# Patient Record
Sex: FEMALE | Race: WHITE | Employment: OTHER | URBAN - METROPOLITAN AREA
[De-identification: names, ages, dates, MRNs, and addresses within clinical notes are randomized per-mention and may not be internally consistent; named-entity substitution may affect disease eponyms.]

---

## 2017-04-28 ENCOUNTER — OFFICE VISIT (OUTPATIENT)
Dept: OPHTHALMOLOGY | Facility: CLINIC | Age: 77
End: 2017-04-28

## 2017-04-28 DIAGNOSIS — H52.13 MYOPIA, BILATERAL: ICD-10-CM

## 2017-04-28 DIAGNOSIS — H25.10 SENILE NUCLEAR SCLEROSIS, UNSPECIFIED LATERALITY: ICD-10-CM

## 2017-04-28 DIAGNOSIS — H40.1131 PRIMARY OPEN ANGLE GLAUCOMA OF BOTH EYES, MILD STAGE: Primary | ICD-10-CM

## 2017-04-28 RX ORDER — TRAVOPROST OPHTHALMIC SOLUTION 0.04 MG/ML
1 SOLUTION OPHTHALMIC AT BEDTIME
Qty: 7.5 ML | Refills: 3 | Status: SHIPPED | OUTPATIENT
Start: 2017-04-28 | End: 2018-05-09

## 2017-04-28 ASSESSMENT — TONOMETRY
IOP_METHOD: APPLANATION
OS_IOP_MMHG: 17
OD_IOP_MMHG: 16

## 2017-04-28 ASSESSMENT — REFRACTION_MANIFEST
OS_SPHERE: -1.75
OD_AXIS: 180
OS_CYLINDER: +1.25
OS_AXIS: 170
OD_CYLINDER: +1.50
OD_SPHERE: -2.75

## 2017-04-28 ASSESSMENT — VISUAL ACUITY
METHOD: SNELLEN - LINEAR
OD_CC: 20/25
OS_CC: 20/30
OS_CC+: +2
OD_CC+: -2

## 2017-04-28 ASSESSMENT — REFRACTION_WEARINGRX
OS_CYLINDER: +1.25
SPECS_TYPE: SVL
OS_AXIS: 170
OS_SPHERE: -1.50
OD_SPHERE: -2.50
OD_CYLINDER: +1.50
OD_AXIS: 173

## 2017-04-28 ASSESSMENT — CONF VISUAL FIELD
OS_NORMAL: 1
OD_NORMAL: 1

## 2017-04-28 ASSESSMENT — PACHYMETRY
EXAM_DATE: 2007
OD_CT(UM): 573
OS_CT(UM): 572

## 2017-04-28 ASSESSMENT — EXTERNAL EXAM - RIGHT EYE: OD_EXAM: NORMAL

## 2017-04-28 ASSESSMENT — CUP TO DISC RATIO
OD_RATIO: 0.3
OS_RATIO: 0.25

## 2017-04-28 ASSESSMENT — EXTERNAL EXAM - LEFT EYE: OS_EXAM: NORMAL

## 2017-04-28 ASSESSMENT — SLIT LAMP EXAM - LIDS
COMMENTS: NORMAL
COMMENTS: NORMAL

## 2017-04-28 NOTE — MR AVS SNAPSHOT
After Visit Summary   2017    Kasia Hernandez    MRN: [id number]           Patient Information     Date Of Birth          1940        Visit Information        Provider Department      2017 10:40 AM Toby Hancock MD [facility]        Today's Diagnoses     Primary open angle glaucoma of both eyes, mild stage    -  1    Senile nuclear sclerosis, unspecified laterality - Both Eyes        Myopia, bilateral           Follow-ups after your visit        Follow-up notes from your care team     Return in about 4 months (around 2017) for Follow Up, Cataract, Glaucoma, BAT.      Who to contact     Please call your clinic at [phone] to:    Ask questions about your health    Make or cancel appointments    Discuss your medicines    Learn about your test results    Speak to your doctor   If you have compliments or concerns about an experience at your clinic, or if you wish to file a complaint, please contact [facility] Patient Relations at [phone] or email us at [email or web address]         Additional Information About Your Visit        MyChart Information     InnovEco is an electronic gateway that provides easy, online access to your medical records. With InnovEco, you can request a clinic appointment, read your test results, renew a prescription or communicate with your care team.     To sign up for InnovEco visit the website at [email or web address]   You will be asked to enter the access code listed below, as well as some personal information. Please follow the directions to create your username and password.     Your access code is: [email or web address]  Expires: 2017 11:56 AM     Your access code will  in 90 days. If you need help or a new code, please contact your [facility] or call [phone] for assistance.        Care EveryWhere ID     This is your Care  EveryWhere ID. This could be used by other organizations to access your [place] medical records  [id number]         Blood Pressure from Last 3 Encounters:   No data found for BP    Weight from Last 3 Encounters:   No data found for Wt              Today, you had the following     No orders found for display         Where to get your medicines      These medications were sent to [facility] - [place], [place] - [address] [place] [address] AT [facility] OF  [address]  [address], [facility] [address]     Phone:  [phone]     travoprost (MIR Free) 0.004 % ophthalmic solution          Primary Care Provider Office Phone # Fax #    Ulysses Ellison [phone]       [facility] [address]  [facility] [address]        Thank you!     Thank you for [facility]  for your care. Our goal is always to provide you with excellent care. Hearing back from our patients is one way we can continue to improve our services. Please take a few minutes to complete the written survey that you may receive in the mail after your visit with us. Thank you!             Your Updated Medication List - Protect others around you: Learn how to safely use, store and throw away your medicines at www.disposemymeds.org.          This list is accurate as of: 4/28/17 12:01 PM.  Always use your most recent med list.                   Brand Name Dispense Instructions for use    aMILoride-hydrochlorothiazide 5-50 MG Tabs per tablet    MODURETIC     Take 1 tablet by mouth daily       aspirin 325 MG tablet      Take by mouth daily       DIOVAN PO          LIPITOR PO      Take 1 tablet by mouth daily       NORVASC PO          PROLIXIN PO          SYNTHROID PO          TOPROL XL PO          travoprost (MIR Free) 0.004 % ophthalmic solution    TRAVATAN Z    7.5 mL    Place 1 drop into both eyes At Bedtime

## 2017-04-28 NOTE — NURSING NOTE
Chief Complaints and History of Present Illnesses   Patient presents with     Cataract Follow Up     No VA changes per Pt     Glaucoma Follow Up     HPI    Symptoms:     No floaters   No flashes   No Dryness   No itching         Do you have eye pain now?:  No      Comments:  Travatan-Z OU (9:00p.m.)  No OTC eye drops  Tati Ibarra COT 11:07 AM April 28, 2017

## 2017-04-28 NOTE — PROGRESS NOTES
Assessment & Plan      Kasia Hernandez is a 76 year old female with the following diagnoses:   (H40.1703) Primary open angle glaucoma of both eyes, mild stage  (primary encounter diagnosis)  Comment: Good control  Plan: travoprost, BAK Free, (TRAVATAN Z) 0.004 %         ophthalmic solution            (H25.10) Senile nuclear sclerosis, unspecified laterality - Both Eyes  Comment: Moderate  Plan: Follow,  BAT next time    (H52.13) Myopia, bilateral  Comment: Small change  Plan: Copy Rx     -----------------------------------------------------------------------------------      Patient disposition:   Return in about 4 months (around 8/28/2017) for Follow Up, Cataract, Glaucoma, BAT. or sooner as needed.    Complete documentation of historical and exam elements from today's encounter can  be found in the full encounter summary report (not reduplicated in this progress  note). I personally obtained the chief complaint(s) and history of present illness. I  confirmed and edited as necessary the review of systems, past medical/surgical  history, family history, social history, and examination findings as documented by  others; and I examined the patient myself. I personally reviewed the relevant tests,  images, and reports as documented above. I formulated and edited as necessary the  assessment and plan and discussed the findings and management plan with the  patient and family.    MAU Hancock M.D

## 2017-08-23 ENCOUNTER — OFFICE VISIT (OUTPATIENT)
Dept: OPHTHALMOLOGY | Facility: CLINIC | Age: 77
End: 2017-08-23

## 2017-08-23 DIAGNOSIS — H25.10 SENILE NUCLEAR SCLEROSIS, UNSPECIFIED LATERALITY: ICD-10-CM

## 2017-08-23 DIAGNOSIS — H40.1131 PRIMARY OPEN ANGLE GLAUCOMA OF BOTH EYES, MILD STAGE: Primary | ICD-10-CM

## 2017-08-23 ASSESSMENT — CONF VISUAL FIELD
OD_NORMAL: 1
OS_NORMAL: 1

## 2017-08-23 ASSESSMENT — VISUAL ACUITY
OD_CC: 20/25
OS_CC: 20/25
METHOD: SNELLEN - LINEAR
OD_CC+: -2
CORRECTION_TYPE: GLASSES

## 2017-08-23 ASSESSMENT — EXTERNAL EXAM - LEFT EYE: OS_EXAM: NORMAL

## 2017-08-23 ASSESSMENT — TONOMETRY
OS_IOP_MMHG: 14
IOP_METHOD: APPLANATION
OD_IOP_MMHG: 14

## 2017-08-23 ASSESSMENT — PACHYMETRY
OS_CT(UM): 572
OD_CT(UM): 573
EXAM_DATE: 2007

## 2017-08-23 ASSESSMENT — SLIT LAMP EXAM - LIDS
COMMENTS: NORMAL
COMMENTS: NORMAL

## 2017-08-23 ASSESSMENT — EXTERNAL EXAM - RIGHT EYE: OD_EXAM: NORMAL

## 2017-08-23 ASSESSMENT — CUP TO DISC RATIO
OD_RATIO: 0.3
OS_RATIO: 0.25

## 2017-08-23 NOTE — MR AVS SNAPSHOT
After Visit Summary   2017    Kasia Hernandez    MRN: [id number]           Patient Information     Date Of Birth          1940        Visit Information        Provider Department      2017 1:40 PM Toby Hancock MD [facility]        Today's Diagnoses     Primary open angle glaucoma of both eyes, mild stage    -  1    Senile nuclear sclerosis, unspecified laterality - Both Eyes           Follow-ups after your visit        Follow-up notes from your care team     Return in about 4 months (around 2017) for Follow Up, Glaucoma, OCT, Cataract.      Who to contact     Please call your clinic at [phone] to:    Ask questions about your health    Make or cancel appointments    Discuss your medicines    Learn about your test results    Speak to your doctor   If you have compliments or concerns about an experience at your clinic, or if you wish to file a complaint, please contact [facility] Patient Relations at [phone] or email us at [email or web address]         Additional Information About Your Visit        MyChart Information     Sportpost.com is an electronic gateway that provides easy, online access to your medical records. With Sportpost.com, you can request a clinic appointment, read your test results, renew a prescription or communicate with your care team.     To sign up for Sportpost.com visit the website at [email or web address]   You will be asked to enter the access code listed below, as well as some personal information. Please follow the directions to create your username and password.     Your access code is: [email or web address]  Expires: 2017  2:13 PM     Your access code will  in 90 days. If you need help or a new code, please contact your [facility] or call [phone] for assistance.        Care EveryWhere ID     This is your Care EveryWhere ID. This could be  used by other organizations to access your [place] medical records  [id number]         Blood Pressure from Last 3 Encounters:   No data found for BP    Weight from Last 3 Encounters:   No data found for Wt              Today, you had the following     No orders found for display       Primary Care Provider Office Phone # Fax #    Ulysses Ellison [phone]       [facility] [address]  [place] [address]        Equal Access to Services     [facility]: Hadii aad ku hadasho Soomaali, waaxda luqadaha, qaybta kaalmada adeegyada, waxay idiin hayaan adeeg kharash la'aan . So [facility] [phone].    ATENCIÓN: Si habla español, tiene a swift disposición servicios gratuitos de asistencia lingüística. [facility] [phone].    We comply with applicable federal civil rights laws and [place] laws. We do not discriminate on the basis of race, color, national origin, age, disability sex, sexual orientation or gender identity.            Thank you!     Thank you for choosing [facility]  for your care. Our goal is always to provide you with excellent care. Hearing back from our patients is one way we can continue to improve our services. Please take a few minutes to complete the written survey that you may receive in the mail after your visit with us. Thank you!             Your Updated Medication List - Protect others around you: Learn how to safely use, store and throw away your medicines at www.disposemymeds.org.          This list is accurate as of: 8/23/17  2:13 PM.  Always use your most recent med list.                   Brand Name Dispense Instructions for use Diagnosis    aMILoride-hydrochlorothiazide 5-50 MG Tabs per tablet    MODURETIC     Take 1 tablet by mouth daily        aspirin 325 MG tablet      Take by mouth daily        DIOVAN PO           LIPITOR PO      Take 1 tablet by mouth daily        NORVASC PO           PROLIXIN PO           SYNTHROID  PO           TOPROL XL PO           travoprost (MIR Free) 0.004 % ophthalmic solution    TRAVATAN Z    7.5 mL    Place 1 drop into both eyes At Bedtime    Primary open angle glaucoma of both eyes, mild stage

## 2017-08-23 NOTE — NURSING NOTE
Chief Complaints and History of Present Illnesses   Patient presents with     Glaucoma Follow Up     No VA changes     HPI    Affected eye(s):  Both   Symptoms:              Comments:  Colleen(10:30pm)TROY Ibarra COT 1:58 PM August 23, 2017

## 2017-08-23 NOTE — PROGRESS NOTES
Assessment & Plan      Kasia Hernandez is a 76 year old female with the following diagnoses:   (H40.6840) Primary open angle glaucoma of both eyes, mild stage  (primary encounter diagnosis)  Comment: Good control  Plan: Cont Travatan-INDY OU    (H25.10) Senile nuclear sclerosis, unspecified laterality - Both Eyes  Comment: Moderate  Plan: Follow     -----------------------------------------------------------------------------------      Patient disposition:   Return in about 4 months (around 12/23/2017) for Follow Up, Glaucoma, OCT, Cataract. or sooner as needed.    Complete documentation of historical and exam elements from today's encounter can  be found in the full encounter summary report (not reduplicated in this progress  note). I personally obtained the chief complaint(s) and history of present illness. I  confirmed and edited as necessary the review of systems, past medical/surgical  history, family history, social history, and examination findings as documented by  others; and I examined the patient myself. I personally reviewed the relevant tests,  images, and reports as documented above. I formulated and edited as necessary the  assessment and plan and discussed the findings and management plan with the  patient and family.    MAU Hancock M.D

## 2018-01-05 ENCOUNTER — OFFICE VISIT (OUTPATIENT)
Dept: OPHTHALMOLOGY | Facility: CLINIC | Age: 78
End: 2018-01-05
Payer: MEDICARE

## 2018-01-05 DIAGNOSIS — H40.1131 PRIMARY OPEN ANGLE GLAUCOMA OF BOTH EYES, MILD STAGE: Primary | ICD-10-CM

## 2018-01-05 DIAGNOSIS — H25.10 SENILE NUCLEAR SCLEROSIS, UNSPECIFIED LATERALITY: ICD-10-CM

## 2018-01-05 ASSESSMENT — CONF VISUAL FIELD
METHOD: COUNTING FINGERS
OS_NORMAL: 1
OD_NORMAL: 1

## 2018-01-05 ASSESSMENT — TONOMETRY
IOP_METHOD: APPLANATION
OD_IOP_MMHG: 15
OS_IOP_MMHG: 16

## 2018-01-05 ASSESSMENT — VISUAL ACUITY
OD_CC: 20/25
OS_CC: 20/30
CORRECTION_TYPE: GLASSES
OS_CC+: -2
METHOD: SNELLEN - LINEAR

## 2018-01-05 ASSESSMENT — PACHYMETRY
EXAM_DATE: 2007
OS_CT(UM): 572
OD_CT(UM): 573

## 2018-01-05 ASSESSMENT — CUP TO DISC RATIO
OS_RATIO: 0.25
OD_RATIO: 0.3

## 2018-01-05 ASSESSMENT — EXTERNAL EXAM - LEFT EYE: OS_EXAM: NORMAL

## 2018-01-05 ASSESSMENT — SLIT LAMP EXAM - LIDS
COMMENTS: NORMAL
COMMENTS: NORMAL

## 2018-01-05 ASSESSMENT — EXTERNAL EXAM - RIGHT EYE: OD_EXAM: NORMAL

## 2018-01-05 NOTE — PROGRESS NOTES
Assessment & Plan      Kasia Hernandez is a 77 year old female with the following diagnoses:   (H40.8297) Primary open angle glaucoma of both eyes, mild stage  (primary encounter diagnosis)  Comment: Good control  Plan: OCT Optic Nerve RNFL Spectralis OU (both eyes)        Cont Travatan-Z OU    (H25.10) Senile nuclear sclerosis, unspecified laterality - Both Eyes  Comment: Moderate  Plan: Follow     -----------------------------------------------------------------------------------      Patient disposition:   Return in about 4 months (around 5/5/2018) for Follow Up, Glaucoma, VF's. or sooner as needed.    Complete documentation of historical and exam elements from today's encounter can  be found in the full encounter summary report (not reduplicated in this progress  note). I personally obtained the chief complaint(s) and history of present illness. I  confirmed and edited as necessary the review of systems, past medical/surgical  history, family history, social history, and examination findings as documented by  others; and I examined the patient myself. I personally reviewed the relevant tests,  images, and reports as documented above. I formulated and edited as necessary the  assessment and plan and discussed the findings and management plan with the  patient and family.    MAU Hancock M.D

## 2018-01-05 NOTE — MR AVS SNAPSHOT
After Visit Summary   2018    Kasia Hernandez    MRN: [id number]           Patient Information     Date Of Birth          1940        Visit Information        Provider Department      2018 8:40 AM Toby Hancock MD [facility]        Today's Diagnoses     Primary open angle glaucoma of both eyes, mild stage    -  1    Senile nuclear sclerosis, unspecified laterality - Both Eyes           Follow-ups after your visit        Follow-up notes from your care team     Return in about 4 months (around 2018) for Follow Up, Glaucoma, VF's.      Who to contact     Please call your clinic at [phone] to:    Ask questions about your health    Make or cancel appointments    Discuss your medicines    Learn about your test results    Speak to your doctor   If you have compliments or concerns about an experience at your clinic, or if you wish to file a complaint, please contact [facility] Patient Relations at [phone] or email us at [email or web address]         Additional Information About Your Visit        MyChart Information     [employer] is an electronic gateway that provides easy, online access to your medical records. With [employer], you can request a clinic appointment, read your test results, renew a prescription or communicate with your care team.     To sign up for [employer] visit the website at [email or web address]   You will be asked to enter the access code listed below, as well as some personal information. Please follow the directions to create your username and password.     Your access code is: [email or web address]  Expires: 3/22/2018  6:30 AM     Your access code will  in 90 days. If you need help or a new code, please contact your [facility] or call [phone] for assistance.        Care EveryWhere ID     This is your Care EveryWhere ID. This could be used by other  organizations to access your [place] medical records  [id number]         Blood Pressure from Last 3 Encounters:   No data found for BP    Weight from Last 3 Encounters:   No data found for Wt              We Performed the Following     OCT Optic Nerve RNFL Spectralis OU (both eyes)        Primary Care Provider Office Phone # Fax #    Ulysses Ellison [phone]       [facility] [address]  [place] [address]        Equal Access to Services     [facility]: Hadii aad ku hadasho Soomaali, waaxda luqadaha, qaybta kaalmada adeegyada, waxay idiin hayaan adeeg kharash la'aan . So [facility] [place] [phone].    ATENCIÓN: Si habla español, tiene a swift disposición servicios gratuitos de asistencia lingüística. [facility] [phone].    We comply with applicable federal civil rights laws and [place] laws. We do not discriminate on the basis of race, color, national origin, age, disability, sex, sexual orientation, or gender identity.            Thank you!     Thank you for choosing [facility]  for your care. Our goal is always to provide you with excellent care. Hearing back from our patients is one way we can continue to improve our services. Please take a few minutes to complete the written survey that you may receive in the mail after your visit with us. Thank you!             Your Updated Medication List - Protect others around you: Learn how to safely use, store and throw away your medicines at www.disposemymeds.org.          This list is accurate as of: 1/5/18  9:11 AM.  Always use your most recent med list.                   Brand Name Dispense Instructions for use Diagnosis    aMILoride-hydrochlorothiazide 5-50 MG Tabs per tablet    MODURETIC     Take 1 tablet by mouth daily        aspirin 325 MG tablet      Take by mouth daily        DIOVAN PO           LIPITOR PO      Take 1 tablet by mouth daily        NORVASC PO           PROLIXIN PO            SYNTHROID PO           TOPROL XL PO           travoprost (MIR Free) 0.004 % ophthalmic solution    TRAVATAN Z    7.5 mL    Place 1 drop into both eyes At Bedtime    Primary open angle glaucoma of both eyes, mild stage

## 2018-05-09 ENCOUNTER — OFFICE VISIT (OUTPATIENT)
Dept: OPHTHALMOLOGY | Facility: CLINIC | Age: 78
End: 2018-05-09
Payer: MEDICARE

## 2018-05-09 DIAGNOSIS — H25.10 SENILE NUCLEAR SCLEROSIS, UNSPECIFIED LATERALITY: ICD-10-CM

## 2018-05-09 DIAGNOSIS — H40.1131 PRIMARY OPEN ANGLE GLAUCOMA OF BOTH EYES, MILD STAGE: Primary | ICD-10-CM

## 2018-05-09 RX ORDER — TRAVOPROST OPHTHALMIC SOLUTION 0.04 MG/ML
1 SOLUTION OPHTHALMIC AT BEDTIME
Qty: 7.5 ML | Refills: 3 | Status: SHIPPED | OUTPATIENT
Start: 2018-05-09 | End: 2019-04-30

## 2018-05-09 ASSESSMENT — PACHYMETRY
EXAM_DATE: 2007
OS_CT(UM): 572
OD_CT(UM): 573

## 2018-05-09 ASSESSMENT — CONF VISUAL FIELD
METHOD: COUNTING FINGERS
OD_NORMAL: 1
OS_NORMAL: 1

## 2018-05-09 ASSESSMENT — VISUAL ACUITY
OD_CC+: +1
OD_CC: 20/30
OS_CC: 20/25
OS_CC+: -2
CORRECTION_TYPE: GLASSES
METHOD: SNELLEN - LINEAR

## 2018-05-09 ASSESSMENT — CUP TO DISC RATIO
OS_RATIO: 0.25
OD_RATIO: 0.3

## 2018-05-09 ASSESSMENT — EXTERNAL EXAM - RIGHT EYE: OD_EXAM: NORMAL

## 2018-05-09 ASSESSMENT — TONOMETRY
OD_IOP_MMHG: 15
OS_IOP_MMHG: 15
IOP_METHOD: TONOPEN

## 2018-05-09 ASSESSMENT — SLIT LAMP EXAM - LIDS
COMMENTS: NORMAL
COMMENTS: NORMAL

## 2018-05-09 ASSESSMENT — EXTERNAL EXAM - LEFT EYE: OS_EXAM: NORMAL

## 2018-05-09 NOTE — PROGRESS NOTES
Assessment & Plan      Kasia Hernandez is a 77 year old female with the following diagnoses:   (H40.4709) Primary open angle glaucoma of both eyes, mild stage  (primary encounter diagnosis)  Comment: Good control  Plan: travoprost, BAK Free, (TRAVATAN Z) 0.004 %         ophthalmic solution, OVF 24-2 Dynamic OU            (H25.10) Senile nuclear sclerosis, unspecified laterality - Both Eyes  Comment: Moderate  Plan: Follow     -----------------------------------------------------------------------------------      Patient disposition:   Return in about 4 months (around 9/9/2018) for Complete Eye Exam, Cataract, Glaucoma, BAT. or sooner as needed.    Complete documentation of historical and exam elements from today's encounter can  be found in the full encounter summary report (not reduplicated in this progress  note). I personally obtained the chief complaint(s) and history of present illness. I  confirmed and edited as necessary the review of systems, past medical/surgical  history, family history, social history, and examination findings as documented by  others; and I examined the patient myself. I personally reviewed the relevant tests,  images, and reports as documented above. I formulated and edited as necessary the  assessment and plan and discussed the findings and management plan with the  patient and family.    MAU Hancock M.D

## 2018-05-09 NOTE — MR AVS SNAPSHOT
After Visit Summary   2018    Kasia Hernandez    MRN: [id number]           Patient Information     Date Of Birth          1940        Visit Information        Provider Department      2018 9:20 AM Toby Hancock MD [facility]        Today's Diagnoses     Primary open angle glaucoma of both eyes, mild stage    -  1    Senile nuclear sclerosis, unspecified laterality - Both Eyes           Follow-ups after your visit        Follow-up notes from your care team     Return in about 4 months (around 2018) for Complete Eye Exam, Cataract, Glaucoma, BAT.      Who to contact     Please call your clinic at [phone] to:    Ask questions about your health    Make or cancel appointments    Discuss your medicines    Learn about your test results    Speak to your doctor            Additional Information About Your Visit        MyChart Information     Straight Up English is an electronic gateway that provides easy, online access to your medical records. With Straight Up English, you can request a clinic appointment, read your test results, renew a prescription or communicate with your care team.     To sign up for AboutOnet visit the website at [email or web address]   You will be asked to enter the access code listed below, as well as some personal information. Please follow the directions to create your username and password.     Your access code is: [email or web address]  Expires: 2018  6:30 AM     Your access code will  in 90 days. If you need help or a new code, please contact your [facility] or call [phone] for assistance.        Care EveryWhere ID     This is your Care EveryWhere ID. This could be used by other organizations to access your [place] medical records  [id number]         Blood Pressure from Last 3 Encounters:   No data found for BP    Weight from Last 3 Encounters:   No data found for Wt              We Performed the  Following     OVF 24-2 Dynamic OU          Where to get your medicines      These medications were sent to [facility] [address] [place] [address] AT [facility] OF  [address]  [address] [place] [address], [facility] [address]     Phone:  [phone]     travoprost (MIR Free) 0.004 % ophthalmic solution          Primary Care Provider Office Phone # Fax #    Ulysses Ellison [phone]       [facility] [address]  [facility] [address]        Equal Access to Services     [facility]: Hadii aad ku hadasho Soomaali, waaxda luqadaha, qaybta kaalmada adeegyada, carmel lyles haylorenan yen smith . So [facility] [place] [phone].    ATENCIÓN: Si habla español, tiene a swift disposición servicios gratuitos de asistencia lingüística. [facility] [phone].    We comply with applicable federal civil rights laws and [place] laws. We do not discriminate on the basis of race, color, national origin, age, disability, sex, sexual orientation, or gender identity.            Thank you!     Thank you for choosing [place] [facility]  for your care. Our goal is always to provide you with excellent care. Hearing back from our patients is one way we can continue to improve our services. Please take a few minutes to complete the written survey that you may receive in the mail after your visit with us. Thank you!             Your Updated Medication List - Protect others around you: Learn how to safely use, store and throw away your medicines at www.disposemymeds.org.          This list is accurate as of 5/9/18  6:21 PM.  Always use your most recent med list.                   Brand Name Dispense Instructions for use Diagnosis    aMILoride-hydrochlorothiazide 5-50 MG Tabs per tablet    MODURETIC     Take 1 tablet by mouth daily        aspirin 325 MG tablet      Take by mouth daily        DIOVAN PO           LIPITOR PO      Take 1 tablet by mouth daily         NORVASC PO           PROLIXIN PO           SYNTHROID PO           TOPROL XL PO           travoprost (MIR Free) 0.004 % ophthalmic solution    TRAVATAN Z    7.5 mL    Place 1 drop into both eyes At Bedtime    Primary open angle glaucoma of both eyes, mild stage

## 2018-05-09 NOTE — NURSING NOTE
No chief complaint on file.    HPI    Affected eye(s):  Both   Symptoms:     No decreased vision   No floaters   No flashes   No tearing   No Dryness      Duration:  6 months   Frequency:  Constant       Do you have eye pain now?:  No      Comments:  Rina ARCHER to BE / CHARLI @ 8:30 pm last cameron Hernandez COT 10:00 AM 05/09/2018

## 2018-09-04 ENCOUNTER — OFFICE VISIT (OUTPATIENT)
Dept: OPHTHALMOLOGY | Facility: CLINIC | Age: 78
End: 2018-09-04
Payer: MEDICARE

## 2018-09-04 DIAGNOSIS — H40.1111 PRIMARY OPEN ANGLE GLAUCOMA OF RIGHT EYE, MILD STAGE: Primary | ICD-10-CM

## 2018-09-04 DIAGNOSIS — H25.13 NUCLEAR SCLEROTIC CATARACT OF BOTH EYES: ICD-10-CM

## 2018-09-04 DIAGNOSIS — H52.203 MYOPIC ASTIGMATISM OF BOTH EYES: ICD-10-CM

## 2018-09-04 DIAGNOSIS — H52.13 MYOPIC ASTIGMATISM OF BOTH EYES: ICD-10-CM

## 2018-09-04 RX ORDER — LOSARTAN POTASSIUM 100 MG/1
100 TABLET ORAL EVERY MORNING
COMMUNITY
Start: 2018-08-01

## 2018-09-04 ASSESSMENT — TONOMETRY
OS_IOP_MMHG: 14
IOP_METHOD: TONOPEN
OD_IOP_MMHG: 17

## 2018-09-04 ASSESSMENT — REFRACTION_WEARINGRX
OD_AXIS: 173
OS_AXIS: 170
OD_CYLINDER: +1.50
OS_SPHERE: -1.50
OS_CYLINDER: +1.25
SPECS_TYPE: SVL
OD_SPHERE: -2.50

## 2018-09-04 ASSESSMENT — VISUAL ACUITY
METHOD: SNELLEN - LINEAR
OD_CC: 20/30-
OS_CC: 20/25-
CORRECTION_TYPE: GLASSES

## 2018-09-04 ASSESSMENT — CONF VISUAL FIELD
OS_NORMAL: 1
METHOD: COUNTING FINGERS
OD_NORMAL: 1

## 2018-09-04 ASSESSMENT — SLIT LAMP EXAM - LIDS
COMMENTS: NORMAL
COMMENTS: NORMAL

## 2018-09-04 ASSESSMENT — REFRACTION_MANIFEST
OS_ADD: +2.75
OD_CYLINDER: +1.25
OS_SPHERE: -1.75
OD_AXIS: 005
OD_SPHERE: -3.00
OD_ADD: +2.75
OS_AXIS: 160
OS_CYLINDER: +1.25

## 2018-09-04 ASSESSMENT — CUP TO DISC RATIO
OS_RATIO: 0.25
OD_RATIO: 0.3

## 2018-09-04 ASSESSMENT — EXTERNAL EXAM - LEFT EYE: OS_EXAM: NORMAL

## 2018-09-04 ASSESSMENT — EXTERNAL EXAM - RIGHT EYE: OD_EXAM: NORMAL

## 2018-09-04 NOTE — MR AVS SNAPSHOT
After Visit Summary   2018    Kasia Hernandez    MRN: [id number]           Patient Information     Date Of Birth          1940        Visit Information        Provider Department      2018 10:40 AM Alivia Davalos MD [facility]        Today's Diagnoses     Primary open angle glaucoma of right eye, mild stage - Both Eyes    -  1    Nuclear sclerotic cataract of both eyes - Both Eyes        Myopic astigmatism of both eyes - Both Eyes           Follow-ups after your visit        Follow-up notes from your care team     Return in about 4 months (around 2019) for follow up- POAG, iop check, , OCT nerve.      Your next 10 appointments already scheduled     2019 10:40 AM CST   Return Visit with Alivia aDvalos MD   [facility])    [facility]  [address] [id number] [phone]              Who to contact     Please call your clinic at [phone] to:    Ask questions about your health    Make or cancel appointments    Discuss your medicines    Learn about your test results    Speak to your doctor            Additional Information About Your Visit        MyChart Information     Cybereason[employer] is an electronic gateway that provides easy, online access to your medical records. With COGEON, you can request a clinic appointment, read your test results, renew a prescription or communicate with your care team.     To sign up for Yu Rongt visit the website at [email or web address]   You will be asked to enter the access code listed below, as well as some personal information. Please follow the directions to create your username and password.     Your access code is: [email or web address]  Expires: 2018  6:30 AM     Your access code will  in 90 days. If you need help or a new code, please contact your [facility]  ----- Message from NBA Valentine sent at 5/18/2017  5:04 PM CDT -----  Please contact patient and inform Pap is normal. Repeat in 3 years. Thank you.   [facility] or call [phone] for assistance.        Care EveryWhere ID     This is your Care EveryWhere ID. This could be used by other organizations to access your [place] medical records  [id number]         Blood Pressure from Last 3 Encounters:   No data found for BP    Weight from Last 3 Encounters:   No data found for Wt              We Performed the Following     OVF 24-2 Dynamic OU     REFRACTION          Today's Medication Changes          These changes are accurate as of 9/4/18  3:21 PM.  If you have any questions, ask your nurse or doctor.               Stop taking these medicines if you haven't already. Please contact your care team if you have questions.     DIOVAN PO   Stopped by:  Alivia Davalos MD                    Primary Care Provider Office Phone # Fax #    Ulysses Ellison [phone]       [facility] [address]  [place] [address]        Equal Access to Services     [facility]: Hadii aad ku hadasho Soomaali, waaxda luqadaha, qaybta kaalmada adeegyada, waxay idiin hayaan adeeg kharash la'aan . So [facility] [phone].    ATENCIÓN: Si habla español, tiene a swift disposición servicios gratuitos de asistencia lingüística. Llame al [phone].    We comply with applicable federal civil rights laws and [place] laws. We do not discriminate on the basis of race, color, national origin, age, disability, sex, sexual orientation, or gender identity.            Thank you!     Thank you for choosing [facility]  for your care. Our goal is always to provide you with excellent care. Hearing back from our patients is one way we can continue to improve our services. Please take a few minutes to complete the written survey that you may receive in the mail after your visit with us. Thank you!             Your Updated Medication List - Protect others around you: Learn how to safely use, store and throw away your  medicines at www.disposemymeds.org.          This list is accurate as of 9/4/18  3:21 PM.  Always use your most recent med list.                   Brand Name Dispense Instructions for use Diagnosis    aMILoride-hydrochlorothiazide 5-50 MG Tabs per tablet    MODURETIC     Take 1 tablet by mouth daily        aspirin 325 MG tablet      Take by mouth daily        LIPITOR PO      Take 1 tablet by mouth daily        losartan 100 MG tablet    COZAAR     Take 100 mg by mouth        NORVASC PO           PROLIXIN PO           SYNTHROID PO           TOPROL XL PO           travoprost (MIR Free) 0.004 % ophthalmic solution    TRAVATAN Z    7.5 mL    Place 1 drop into both eyes At Bedtime    Primary open angle glaucoma of both eyes, mild stage

## 2019-01-11 ENCOUNTER — OFFICE VISIT (OUTPATIENT)
Dept: OPHTHALMOLOGY | Facility: CLINIC | Age: 79
End: 2019-01-11
Payer: MEDICARE

## 2019-01-11 DIAGNOSIS — H25.13 NUCLEAR SCLEROTIC CATARACT OF BOTH EYES: ICD-10-CM

## 2019-01-11 DIAGNOSIS — H11.433 CONJUNCTIVAL HYPEREMIA OF BOTH EYES: ICD-10-CM

## 2019-01-11 DIAGNOSIS — H40.1111 PRIMARY OPEN ANGLE GLAUCOMA (POAG) OF RIGHT EYE, MILD STAGE: Primary | ICD-10-CM

## 2019-01-11 ASSESSMENT — REFRACTION_WEARINGRX
OS_CYLINDER: +1.25
OS_SPHERE: +1.50
OD_AXIS: 173
OS_AXIS: 170
OD_SPHERE: -2.50
OD_CYLINDER: +1.50
OS_CYLINDER: +0.75
OD_CYLINDER: +1.50
OD_AXIS: 171
OS_AXIS: 175
SPECS_TYPE: DVA
OS_SPHERE: -1.50
SPECS_TYPE: NVA
OD_SPHERE: +0.50

## 2019-01-11 ASSESSMENT — SLIT LAMP EXAM - LIDS
COMMENTS: NORMAL
COMMENTS: NORMAL

## 2019-01-11 ASSESSMENT — VISUAL ACUITY
OD_CC: 20/25
OD_CC+: +1
METHOD: SNELLEN - LINEAR
OS_CC: 20/20
OS_CC+: -2
CORRECTION_TYPE: GLASSES

## 2019-01-11 ASSESSMENT — TONOMETRY
OD_IOP_MMHG: 14
IOP_METHOD: APPLANATION
OS_IOP_MMHG: 14

## 2019-01-11 ASSESSMENT — EXTERNAL EXAM - RIGHT EYE: OD_EXAM: NORMAL

## 2019-01-11 ASSESSMENT — EXTERNAL EXAM - LEFT EYE: OS_EXAM: NORMAL

## 2019-01-11 NOTE — NURSING NOTE
Chief Complaints and History of Present Illnesses   Patient presents with     Glaucoma Follow-Up     Chief Complaint(s) and History of Present Illness(es)     Glaucoma Follow-Up     Laterality: both eyes    Compliance with Treatment: always    Pain scale: 0/10              Comments     Pt returns for POAG follow up. Vision is stable. No problems using drops and last drop used last night. Vision is stable. YOUNG ESCALANTE, COA 10:59 AM 01/11/2019

## 2019-01-11 NOTE — PROGRESS NOTES
HPI  Kasia Hernandez is a 78 year old female here for  glaucoma followup.  Minimal blurred vision both eyes with current glasses but would like a reading glasses prescription on hand.  Eyes feel comfortable, using travatan Z regularly.    PMH:  hypertension  POH:  Glasses for myopic astigmatism, Primary open angle glaucoma (POAG) right eye>os, cataracts OU, no surgery, no trauma  Oc Meds:  Travatan Z ou q HS   FH:  Denies any glaucoma, age related macular degeneration, or other known eye diseases       Assessment & Plan      (H40.1111) Primary open angle glaucoma of right eye, mild stage - Both Eyes  (primary encounter diagnosis)  Comment:   suspect left eye   K pachy:   573/572 (-2)   Tmax:  26 per old notes     HVF:  fluc     CDR:  0.3/0.35     HRT/OCT:   nl ou   1/19   FHX of Glc:  No        Gonio:       Intolerant to: BAK   Asthma/COPD:  no   Steroid Use:       Kidney Stones:       Sulfa Allergy:   no   IOP targets:  High 20s  Today's IOP: stable, good control       Plan: continue travatan Z both eyes at bedtime  OCT RNFL today- stable both eyes   OVF 24-2 Dynamic- next visit    (H25.13) Nuclear sclerotic cataract of both eyes - Both Eyes  Comment: moderate, meeting needs  Plan: follow    (H11.433) Conjunctival hyperemia of both eyes - Both Eyes  Comment: does not recall bak intolerance, asymptomatic with ranjith z but likely hyperemic as pga side effect  Plan: discussed with patient preservative free artificial tears as needed, examples of brands given      -----------------------------------------------------------------------------------    Patient disposition:   Return in about 5 months (around 6/11/2019) for follow up- POAG, iop check,, Octopus 24-2. Call for sooner appointment as needed.    Complete documentation of historical and exam elements from today's encounter can be found in the full encounter summary report (not reduplicated in this progress note). I personally obtained the chief complaint(s) and  history of present illness.  I have confirmed and edited as necessary the CC, HPI, PMH/PSH, social history, FMH, ROS, and exam/neuro findings as obtained by the technician or others. I have examined this patient myself and I personally viewed the image(s) and studies listed above and the documentation reflects my findings and interpretation.  I formulated and edited as necessary the assessment and plan and discussed the findings and management plan with the patient and family.     Alivia Davalos MD

## 2019-04-30 DIAGNOSIS — H40.1131 PRIMARY OPEN ANGLE GLAUCOMA OF BOTH EYES, MILD STAGE: ICD-10-CM

## 2019-04-30 RX ORDER — TRAVOPROST OPHTHALMIC SOLUTION 0.04 MG/ML
1 SOLUTION OPHTHALMIC AT BEDTIME
Qty: 7.5 ML | Refills: 2 | Status: SHIPPED | OUTPATIENT
Start: 2019-04-30 | End: 2024-09-18

## 2019-06-11 ENCOUNTER — OFFICE VISIT (OUTPATIENT)
Dept: OPHTHALMOLOGY | Facility: CLINIC | Age: 79
End: 2019-06-11
Payer: MEDICARE

## 2019-06-11 DIAGNOSIS — H25.13 NUCLEAR SCLEROTIC CATARACT OF BOTH EYES: ICD-10-CM

## 2019-06-11 DIAGNOSIS — H40.1131 PRIMARY OPEN ANGLE GLAUCOMA OF BOTH EYES, MILD STAGE: Primary | ICD-10-CM

## 2019-06-11 DIAGNOSIS — H04.123 DRY EYES, BILATERAL: ICD-10-CM

## 2019-06-11 ASSESSMENT — CONF VISUAL FIELD
OD_NORMAL: 1
OS_NORMAL: 1

## 2019-06-11 ASSESSMENT — VISUAL ACUITY
OD_CC+: -2/+1
OS_CC: 20/25
METHOD: SNELLEN - LINEAR
OD_CC: 20/25
CORRECTION_TYPE: GLASSES

## 2019-06-11 ASSESSMENT — TONOMETRY
OS_IOP_MMHG: 18
OD_IOP_MMHG: 18
IOP_METHOD: APPLANATION

## 2019-06-11 ASSESSMENT — EXTERNAL EXAM - LEFT EYE: OS_EXAM: NORMAL

## 2019-06-11 ASSESSMENT — PACHYMETRY
OS_CT(UM): 572
OD_CT(UM): 573
EXAM_DATE: 2007

## 2019-06-11 ASSESSMENT — EXTERNAL EXAM - RIGHT EYE: OD_EXAM: NORMAL

## 2019-06-11 ASSESSMENT — SLIT LAMP EXAM - LIDS
COMMENTS: NORMAL
COMMENTS: NORMAL

## 2019-06-11 NOTE — PROGRESS NOTES
HPI  Kasia Hernandez is a 78 year old female here for Octopus Visual Field and glaucoma followup.  on   Eyes feel comfortable, using travatan Z regularly.    PMH:  hypertension  POH:  Glasses for myopic astigmatism, Primary open angle glaucoma (POAG) right eye>os, cataracts OU, no surgery, no trauma  Oc Meds:  Travatan Z ou q HS   FH:  Denies any glaucoma, age related macular degeneration, or other known eye diseases       Assessment & Plan      (H40.1111) Primary open angle glaucoma of right eye, mild stage - Both Eyes  (primary encounter diagnosis)  Comment:   suspect left eye   K pachy:   573/572 (-2)   Tmax:  26 per old notes     HVF:  fluc     CDR:  0.3/0.35     HRT/OCT:   nl ou   1/19   FHX of Glc:  No        Gonio:       Intolerant to: BAK   Asthma/COPD:  no   Steroid Use:       Kidney Stones:       Sulfa Allergy:   no   IOP targets:  High 20s  Today's IOP: stable, good control       Plan: continue travatan Z both eyes at bedtime  OCT retinal nerve fiber layer-  next visit  OVF 24-2 Dynamic today- stable both eyes some fluctuation and likely some decreased sensitivity due to cataracts    (H25.13) Nuclear sclerotic cataract of both eyes - Both Eyes  Comment: moderate, meeting needs  Plan: follow    (H04.123) Dry eyes, bilateral - Both Eyes  Comment: mild, no cornea signs except a few Superficial punctate epithelial erosions left eye inferiorly, relatively asymptomatic   Plan: trial of artificial tear drops four times a day as needed and preservative-free artificial tears if more than 6 x per day      -----------------------------------------------------------------------------------    Patient disposition:   Return in about 6 months (around 12/11/2019) for Comprehensive Exam, glaucoma- OCT nerve (RNFL) OU. Call for sooner appointment as needed.    Complete documentation of historical and exam elements from today's encounter can be found in the full encounter summary report (not reduplicated in this progress  note). I personally obtained the chief complaint(s) and history of present illness.  I have confirmed and edited as necessary the CC, HPI, PMH/PSH, social history, FMH, ROS, and exam/neuro findings as obtained by the technician or others. I have examined this patient myself and I personally viewed the image(s) and studies listed above and the documentation reflects my findings and interpretation.  I formulated and edited as necessary the assessment and plan and discussed the findings and management plan with the patient and family.     Alivia Davalos MD

## 2020-06-02 DIAGNOSIS — Z11.59 ENCOUNTER FOR SCREENING FOR OTHER VIRAL DISEASES: Primary | ICD-10-CM

## 2020-06-13 DIAGNOSIS — Z11.59 ENCOUNTER FOR SCREENING FOR OTHER VIRAL DISEASES: ICD-10-CM

## 2020-06-13 PROCEDURE — U0003 INFECTIOUS AGENT DETECTION BY NUCLEIC ACID (DNA OR RNA); SEVERE ACUTE RESPIRATORY SYNDROME CORONAVIRUS 2 (SARS-COV-2) (CORONAVIRUS DISEASE [COVID-19]), AMPLIFIED PROBE TECHNIQUE, MAKING USE OF HIGH THROUGHPUT TECHNOLOGIES AS DESCRIBED BY CMS-2020-01-R: HCPCS | Performed by: OPHTHALMOLOGY

## 2020-06-13 PROCEDURE — 99000 SPECIMEN HANDLING OFFICE-LAB: CPT | Performed by: OPHTHALMOLOGY

## 2020-06-13 PROCEDURE — 99207 ZZC NO BILLABLE SERVICE THIS VISIT: CPT

## 2020-06-14 LAB
SARS-COV-2 RNA SPEC QL NAA+PROBE: NOT DETECTED
SPECIMEN SOURCE: NORMAL

## 2020-06-15 ENCOUNTER — ANESTHESIA EVENT (OUTPATIENT)
Dept: SURGERY | Facility: CLINIC | Age: 80
End: 2020-06-15
Payer: MEDICARE

## 2020-06-15 NOTE — ANESTHESIA PREPROCEDURE EVALUATION
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             ROS/MED HX    ENT/Pulmonary:      (-) sleep apnea   Neurologic:       Cardiovascular:     (+) hypertension----. : . . . :. .       METS/Exercise Tolerance:     Hematologic:         Musculoskeletal:         GI/Hepatic:         Renal/Genitourinary:         Endo:     (+) thyroid problem .      Psychiatric:         Infectious Disease:         Malignancy:   (+) Malignancy History of Skin          Other:                                 No results found for: WBC, HGB, HCT, PLT, CRP, SED, NA, POTASSIUM, CHLORIDE, CO2, BUN, CR, GLC, KATHY, PHOS, MAG, ALBUMIN, PROTTOTAL, ALT, AST, GGT, ALKPHOS, BILITOTAL, BILIDIRECT, LIPASE, AMYLASE, NU, PTT, INR, FIBR, TSH, T4, T3, HCG, HCGS, CKTOTAL, CKMB, TROPN    Preop Vitals  BP Readings from Last 3 Encounters:   No data found for BP    Pulse Readings from Last 3 Encounters:   No data found for Pulse      Resp Readings from Last 3 Encounters:   No data found for Resp    SpO2 Readings from Last 3 Encounters:   No data found for SpO2      Temp Readings from Last 1 Encounters:   No data found for Temp    Ht Readings from Last 1 Encounters:   No data found for Ht      Wt Readings from Last 1 Encounters:   No data found for Wt    There is no height or weight on file to calculate BMI.       Anesthesia Plan      History & Physical Review  History and physical reviewed and following examination; no interval change.    ASA Status:  2 .    NPO Status:  > 8  hours    Plan for MAC Reason for MAC:  Procedure to face, neck, head or breast  PONV prophylaxis:  Ondansetron (or other 5HT-3)         Postoperative Care  Postoperative pain management:  IV analgesics and Oral pain medications.      Consents  Anesthetic plan, risks, benefits and alternatives discussed with:  Patient..                 Anita Gonzalez MD, MD

## 2020-06-16 ENCOUNTER — HOSPITAL ENCOUNTER (OUTPATIENT)
Facility: CLINIC | Age: 80
Discharge: HOME | End: 2020-06-16
Attending: OPHTHALMOLOGY | Admitting: OPHTHALMOLOGY
Payer: MEDICARE

## 2020-06-16 ENCOUNTER — ANESTHESIA (OUTPATIENT)
Dept: SURGERY | Facility: CLINIC | Age: 80
End: 2020-06-16
Payer: MEDICARE

## 2020-06-16 VITALS
SYSTOLIC BLOOD PRESSURE: 138 MMHG | HEART RATE: 53 BPM | DIASTOLIC BLOOD PRESSURE: 70 MMHG | HEIGHT: 63 IN | BODY MASS INDEX: 27.04 KG/M2 | OXYGEN SATURATION: 95 % | TEMPERATURE: 96.7 F | RESPIRATION RATE: 14 BRPM | WEIGHT: 152.6 LBS

## 2020-06-16 PROCEDURE — 37000009 ZZH ANESTHESIA TECHNICAL FEE, EACH ADDTL 15 MIN: Performed by: OPHTHALMOLOGY

## 2020-06-16 PROCEDURE — 93005 ELECTROCARDIOGRAM TRACING: CPT

## 2020-06-16 PROCEDURE — 37000008 ZZH ANESTHESIA TECHNICAL FEE, 1ST 30 MIN: Performed by: OPHTHALMOLOGY

## 2020-06-16 PROCEDURE — 88331 PATH CONSLTJ SURG 1 BLK 1SPC: CPT | Mod: 26,76 | Performed by: OPHTHALMOLOGY

## 2020-06-16 PROCEDURE — 88305 TISSUE EXAM BY PATHOLOGIST: CPT | Performed by: OPHTHALMOLOGY

## 2020-06-16 PROCEDURE — 88305 TISSUE EXAM BY PATHOLOGIST: CPT | Mod: 26,76 | Performed by: OPHTHALMOLOGY

## 2020-06-16 PROCEDURE — 36000058 ZZH SURGERY LEVEL 3 EA 15 ADDTL MIN: Performed by: OPHTHALMOLOGY

## 2020-06-16 PROCEDURE — 40000065 ZZH STATISTIC EKG NON-CHARGEABLE

## 2020-06-16 PROCEDURE — 71000012 ZZH RECOVERY PHASE 1 LEVEL 1 FIRST HR: Performed by: OPHTHALMOLOGY

## 2020-06-16 PROCEDURE — 27210794 ZZH OR GENERAL SUPPLY STERILE: Performed by: OPHTHALMOLOGY

## 2020-06-16 PROCEDURE — 25800030 ZZH RX IP 258 OP 636: Performed by: NURSE ANESTHETIST, CERTIFIED REGISTERED

## 2020-06-16 PROCEDURE — 25000125 ZZHC RX 250: Performed by: OPHTHALMOLOGY

## 2020-06-16 PROCEDURE — 25000125 ZZHC RX 250: Performed by: NURSE ANESTHETIST, CERTIFIED REGISTERED

## 2020-06-16 PROCEDURE — 93010 ELECTROCARDIOGRAM REPORT: CPT | Performed by: INTERNAL MEDICINE

## 2020-06-16 PROCEDURE — 25000128 H RX IP 250 OP 636: Performed by: NURSE ANESTHETIST, CERTIFIED REGISTERED

## 2020-06-16 PROCEDURE — 71000027 ZZH RECOVERY PHASE 2 EACH 15 MINS: Performed by: OPHTHALMOLOGY

## 2020-06-16 PROCEDURE — 88331 PATH CONSLTJ SURG 1 BLK 1SPC: CPT | Mod: 59 | Performed by: OPHTHALMOLOGY

## 2020-06-16 PROCEDURE — 36000056 ZZH SURGERY LEVEL 3 1ST 30 MIN: Performed by: OPHTHALMOLOGY

## 2020-06-16 PROCEDURE — 40000170 ZZH STATISTIC PRE-PROCEDURE ASSESSMENT II: Performed by: OPHTHALMOLOGY

## 2020-06-16 RX ORDER — ERYTHROMYCIN 5 MG/G
OINTMENT OPHTHALMIC PRN
Status: DISCONTINUED | OUTPATIENT
Start: 2020-06-16 | End: 2020-06-16 | Stop reason: HOSPADM

## 2020-06-16 RX ORDER — ONDANSETRON 2 MG/ML
4 INJECTION INTRAMUSCULAR; INTRAVENOUS EVERY 30 MIN PRN
Status: DISCONTINUED | OUTPATIENT
Start: 2020-06-16 | End: 2020-06-16 | Stop reason: HOSPADM

## 2020-06-16 RX ORDER — ERYTHROMYCIN 5 MG/G
OINTMENT OPHTHALMIC
Status: DISCONTINUED
Start: 2020-06-16 | End: 2020-06-16 | Stop reason: HOSPADM

## 2020-06-16 RX ORDER — SODIUM CHLORIDE, SODIUM LACTATE, POTASSIUM CHLORIDE, CALCIUM CHLORIDE 600; 310; 30; 20 MG/100ML; MG/100ML; MG/100ML; MG/100ML
INJECTION, SOLUTION INTRAVENOUS CONTINUOUS
Status: DISCONTINUED | OUTPATIENT
Start: 2020-06-16 | End: 2020-06-16 | Stop reason: HOSPADM

## 2020-06-16 RX ORDER — SODIUM CHLORIDE, SODIUM LACTATE, POTASSIUM CHLORIDE, CALCIUM CHLORIDE 600; 310; 30; 20 MG/100ML; MG/100ML; MG/100ML; MG/100ML
INJECTION, SOLUTION INTRAVENOUS CONTINUOUS PRN
Status: DISCONTINUED | OUTPATIENT
Start: 2020-06-16 | End: 2020-06-16

## 2020-06-16 RX ORDER — HYDRALAZINE HYDROCHLORIDE 20 MG/ML
2.5-5 INJECTION INTRAMUSCULAR; INTRAVENOUS EVERY 10 MIN PRN
Status: DISCONTINUED | OUTPATIENT
Start: 2020-06-16 | End: 2020-06-16 | Stop reason: HOSPADM

## 2020-06-16 RX ORDER — PROPOFOL 10 MG/ML
INJECTION, EMULSION INTRAVENOUS PRN
Status: DISCONTINUED | OUTPATIENT
Start: 2020-06-16 | End: 2020-06-16

## 2020-06-16 RX ORDER — HYDROMORPHONE HYDROCHLORIDE 1 MG/ML
.3-.5 INJECTION, SOLUTION INTRAMUSCULAR; INTRAVENOUS; SUBCUTANEOUS EVERY 10 MIN PRN
Status: DISCONTINUED | OUTPATIENT
Start: 2020-06-16 | End: 2020-06-16 | Stop reason: HOSPADM

## 2020-06-16 RX ORDER — ALBUTEROL SULFATE 0.83 MG/ML
2.5 SOLUTION RESPIRATORY (INHALATION) EVERY 4 HOURS PRN
Status: DISCONTINUED | OUTPATIENT
Start: 2020-06-16 | End: 2020-06-16 | Stop reason: HOSPADM

## 2020-06-16 RX ORDER — LIDOCAINE HYDROCHLORIDE 20 MG/ML
INJECTION, SOLUTION INFILTRATION; PERINEURAL PRN
Status: DISCONTINUED | OUTPATIENT
Start: 2020-06-16 | End: 2020-06-16

## 2020-06-16 RX ORDER — LIDOCAINE HCL/EPINEPHRINE/PF 2%-1:200K
VIAL (ML) INJECTION
Status: DISCONTINUED
Start: 2020-06-16 | End: 2020-06-16 | Stop reason: HOSPADM

## 2020-06-16 RX ORDER — NALOXONE HYDROCHLORIDE 0.4 MG/ML
.1-.4 INJECTION, SOLUTION INTRAMUSCULAR; INTRAVENOUS; SUBCUTANEOUS
Status: DISCONTINUED | OUTPATIENT
Start: 2020-06-16 | End: 2020-06-16 | Stop reason: HOSPADM

## 2020-06-16 RX ORDER — LIDOCAINE HCL/EPINEPHRINE/PF 2%-1:200K
VIAL (ML) INJECTION PRN
Status: DISCONTINUED | OUTPATIENT
Start: 2020-06-16 | End: 2020-06-16 | Stop reason: HOSPADM

## 2020-06-16 RX ORDER — MAGNESIUM HYDROXIDE 1200 MG/15ML
LIQUID ORAL PRN
Status: DISCONTINUED | OUTPATIENT
Start: 2020-06-16 | End: 2020-06-16 | Stop reason: HOSPADM

## 2020-06-16 RX ORDER — FENTANYL CITRATE 0.05 MG/ML
25-50 INJECTION, SOLUTION INTRAMUSCULAR; INTRAVENOUS
Status: DISCONTINUED | OUTPATIENT
Start: 2020-06-16 | End: 2020-06-16 | Stop reason: HOSPADM

## 2020-06-16 RX ORDER — LABETALOL HYDROCHLORIDE 5 MG/ML
10 INJECTION, SOLUTION INTRAVENOUS
Status: DISCONTINUED | OUTPATIENT
Start: 2020-06-16 | End: 2020-06-16 | Stop reason: HOSPADM

## 2020-06-16 RX ORDER — MEPERIDINE HYDROCHLORIDE 25 MG/ML
12.5 INJECTION INTRAMUSCULAR; INTRAVENOUS; SUBCUTANEOUS
Status: DISCONTINUED | OUTPATIENT
Start: 2020-06-16 | End: 2020-06-16 | Stop reason: HOSPADM

## 2020-06-16 RX ORDER — ONDANSETRON 2 MG/ML
INJECTION INTRAMUSCULAR; INTRAVENOUS PRN
Status: DISCONTINUED | OUTPATIENT
Start: 2020-06-16 | End: 2020-06-16

## 2020-06-16 RX ORDER — ONDANSETRON 4 MG/1
4 TABLET, ORALLY DISINTEGRATING ORAL EVERY 30 MIN PRN
Status: DISCONTINUED | OUTPATIENT
Start: 2020-06-16 | End: 2020-06-16 | Stop reason: HOSPADM

## 2020-06-16 RX ADMIN — ONDANSETRON 4 MG: 2 INJECTION INTRAMUSCULAR; INTRAVENOUS at 07:36

## 2020-06-16 RX ADMIN — SODIUM CHLORIDE, POTASSIUM CHLORIDE, SODIUM LACTATE AND CALCIUM CHLORIDE: 600; 310; 30; 20 INJECTION, SOLUTION INTRAVENOUS at 07:30

## 2020-06-16 RX ADMIN — LIDOCAINE HYDROCHLORIDE 25 MG: 20 INJECTION, SOLUTION INFILTRATION; PERINEURAL at 07:34

## 2020-06-16 RX ADMIN — DEXMEDETOMIDINE HYDROCHLORIDE 12 MCG: 100 INJECTION, SOLUTION INTRAVENOUS at 07:43

## 2020-06-16 RX ADMIN — PROPOFOL 60 MG: 10 INJECTION, EMULSION INTRAVENOUS at 07:39

## 2020-06-16 ASSESSMENT — MIFFLIN-ST. JEOR: SCORE: 1131.32

## 2020-06-16 NOTE — ANESTHESIA CARE TRANSFER NOTE
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    Anesthesia Type:   MAC     Note:  Airway :Room Air  Patient transferred to:PACU  Comments: At end of procedure, spontaneous respirations, patient alert to voice, able to follow commands. Patient breathing room air to PACU. SpO2, NiBP, and EKG monitors and alarms on and functioning, report on patient's clinical status given to PACU RN, RN questions answered.Handoff Report: Identifed the Patient, Identified the Reponsible Provider, Reviewed the pertinent medical history, Discussed the surgical course, Reviewed Intra-OP anesthesia mangement and issues during anesthesia, Set expectations for post-procedure period and Allowed opportunity for questions and acknowledgement of understanding      Vitals: (Last set prior to Anesthesia Care Transfer)    CRNA VITALS  6/16/2020 0748 - 6/16/2020 0822      6/16/2020             Resp Rate (set):  10                Electronically Signed By: MELANIE Spencer CRNA  June 16, 2020  8:22 AM

## 2020-06-16 NOTE — OP NOTE
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

## 2020-06-16 NOTE — ANESTHESIA POSTPROCEDURE EVALUATION
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    Anesthesia Type:  MAC    Note:  Anesthesia Post Evaluation    Patient location during evaluation: PACU  Patient participation: Able to fully participate in evaluation  Level of consciousness: awake  Pain management: adequate  Airway patency: patent  Cardiovascular status: acceptable  Respiratory status: acceptable  Hydration status: acceptable  PONV: none     Anesthetic complications: None          Last vitals:  Vitals:    06/16/20 0845 06/16/20 0852 06/16/20 0922   BP: 139/75 139/75 138/70   Pulse: 53     Resp: 12 9 14   Temp:      SpO2: 96% 94% 95%         Electronically Signed By: Anita Gonzalez MD, MD  June 16, 2020  11:31 AM

## 2020-06-16 NOTE — OP NOTE
Procedure Date: 2020      PROCEDURES:     1.  Excision of right lateral canthal lesion with frozen section control.   2.  Right lateral canthoplasty.      PREOPERATIVE DIAGNOSIS:  Basal cell carcinoma, right lateral canthus.      SURGEON:  Higinio Cross MD      ANESTHESIA:  Local monitored.      COMPLICATIONS:  None.      INDICATION FOR SURGERY:  The patient had a basal cell carcinoma in the right lateral canthus which had previously been biopsied.  The patient now presents for surgical excision with reconstruction.  The procedure went as follows:      PROCEDURE IN DETAIL:  The area was injected with 2% lidocaine with 1:100,000 epinephrine.  An incision was made in the midst of the previous scar, and a 2.5 mm margin was excised on the medial lateral aspect of the scar.  The deep margin was also excised.  All of these margins were submitted for frozen section control and proved to be negative for residual tumor.  The defect was now closed with deep 6-0 Vicryl suture and superficial with 6-0 chromic suture.  Normal contour was reestablished in the lateral canthus.  The patient tolerated the procedure well.        The total defect measured approximately 12 mm in length.         HIGINIO CROSS MD             D: 2020   T: 2020   MT: GAMAL      Name:     NAUN SMITH   MRN:      [id number]        Account:        [id number]   :      1940           Procedure Date: 2020      Document: [id number]

## 2020-06-16 NOTE — DISCHARGE INSTRUCTIONS
Same Day Surgery Discharge Instructions for  Sedation and General Anesthesia       It's not unusual to feel dizzy, light-headed or faint for up to 24 hours after surgery or while taking pain medication.  If you have these symptoms: sit for a few minutes before standing and have someone assist you when you get up to walk or use the bathroom.      You should rest and relax for the next 24 hours. We recommend you make arrangements to have an adult stay with you for at least 24 hours after your discharge.  Avoid hazardous and strenuous activity.      DO NOT DRIVE any vehicle or operate mechanical equipment for 24 hours following the end of your surgery.  Even though you may feel normal, your reactions may be affected by the medication you have received.      Do not drink alcoholic beverages for 24 hours following surgery.       Slowly progress to your regular diet as you feel able. It's not unusual to feel nauseated and/or vomit after receiving anesthesia.  If you develop these symptoms, drink clear liquids (apple juice, ginger ale, broth, 7-up, etc. ) until you feel better.  If your nausea and vomiting persists for 24 hours, please notify your surgeon.        All narcotic pain medications, along with inactivity and anesthesia, can cause constipation. Drinking plenty of liquids and increasing fiber intake will help.      For any questions of a medical nature, call your surgeon.      Do not make important decisions for 24 hours.      If you had general anesthesia, you may have a sore throat for a couple of days related to the breathing tube used during surgery.  You may use Cepacol lozenges to help with this discomfort.  If it worsens or if you develop a fever, contact your surgeon.       If you feel your pain is not well managed with the pain medications prescribed by your surgeon, please contact your surgeon's office to let them know so they can address your concerns.       CoVid 19 Information    We want to give you  information regarding Covid. Please consult your primary care provider with any questions you might have.     Patient who have symptoms (cough, fever, or shortness of breath), need to isolate for 7 days from when symptoms started OR 72 hours after fever resolves (without fever reducing medications) AND improvement of respiratory symptoms (whichever is longer).      Isolate yourself at home (in own room/own bathroom if possible)    Do Not allow any visitors    Do Not go to work or school    Do Not go to [facility],  centers, shopping, or other public places.    Do Not shake hands.    Avoid close and intimate contact with others (hugging, kissing).    Follow CDC recommendations for household cleaning of frequently touched services.     After the initial 7 days, continue to isolate yourself from household members as much as possible. To continue decrease the risk of community spread and exposure, you and any members of your household should limit activities in public for 14 days after starting home isolation.     You can reference the following CDC link for helpful home isolation/care tips:  https://www.cdc.gov/coronavirus/2019-ncov/downloads/10Things.pdf    Protect Others:    Cover Your Mouth and Nose with a mask, disposable tissue or wash cloth to avoid spreading germs to others.    Wash your hands and face frequently with soap and water    Call Your Primary Doctor If: Breathing difficulty develops or you become worse.    For more information about COVID19 and options for caring for yourself at home, please visit the CDC website at https://www.cdc.gov/coronavirus/2019-ncov/about/steps-when-sick.html  For more options for care at [facility], please visit our website at [email or web address]          [facility]   Eyelid/Orbital Surgery Discharge Instructions    Higinio Cross M.D.     ICE COMPRESSES  Immediately following surgery, you should begin to apply ice  compresses.  Apply a cold gel pack or wrap a clean washcloth around a cup of crushed ice in a plastic bag (a bag of frozen peas also works well) and hold the cold compresses directly against the closed eyelid (s).Apply cold pack for a minimum of six times daily for no longer than 15 minutes at a time. Continue cold compresses every day until the bruising and swelling begin to subside.  This can vary for each patient, but three days may be common.    HOT COMPRESSES  After your swelling and bruising have begun to subside, hot compresses should be applied.  Take a clean washcloth and wring it out in hot water (as warm as you can tolerate comfortably).  Hold this warm compress against the closed eyelid(s) at least six times per day for 15 minutes.  This should be continued for about two weeks.    OINTMENT  You may be given some ointment when you leave the hospital.  Apply this ointment as directed on the pharmacy label for 7 days.  Expect some blurring of vision from the ointment.     ACTIVITY  Avoid heavy lifting or vigorous exercise for one week after surgery.  You may resume regular activities as tolerated.  You may shower and wash your hair on the day after surgery; be careful to avoid soaking the wounds or getting shampoo in your eyes. While your eyes are still swollen, it is recommended you sleep on your back and elevate your head with 2-3 pillows.    MEDICATION  If the doctor has given you some medications to take after surgery, please take these according to the instructions on the bottle.  Pain medications may make you drowsy so do not drive, operate heavy machinery, or use alcohol while taking it.  When you feel that you do not need the prescription pain medication, you may substitute Extra Strength Tylenol for mild pain by also following the directions on the bottle.    If you were on Coumadin, you may resume taking it with the next scheduled dose.      WHAT TO EXPECT  You should expect some slight oozing of  blood from the incision site over the first two to three days after surgery.  Swelling and bruising will occur for one to two weeks or longer.  You may also experience itching and tearing during the first several weeks after surgery.  This is part of the normal healing process    QUESTIONS  Please feel free to contact the office, should you have any questions that are not answered above.  The phone number is [phone].  Please call immediately if you are unable to establish vision in the operative eye, you are experiencing heavy bleeding that will not stop with gentle pressure or you have any signs of an infection (greenish/yellow discharge or progressive redness).    [facility]  [address]  [place], [place] [address]  [phone]

## 2020-06-17 LAB — COPATH REPORT: NORMAL

## 2020-06-18 LAB — INTERPRETATION ECG - MUSE: NORMAL

## 2022-07-05 ENCOUNTER — TRANSFERRED RECORDS (OUTPATIENT)
Dept: HEALTH INFORMATION MANAGEMENT | Facility: CLINIC | Age: 82
End: 2022-07-05

## 2022-07-29 ENCOUNTER — TRANSFERRED RECORDS (OUTPATIENT)
Dept: HEALTH INFORMATION MANAGEMENT | Facility: CLINIC | Age: 82
End: 2022-07-29

## 2022-08-01 PROBLEM — C51.9 VULVAR CANCER (H): Status: ACTIVE | Noted: 2022-08-01

## 2022-08-30 RX ORDER — CLINDAMYCIN HCL 300 MG
600 CAPSULE ORAL DAILY PRN
COMMUNITY

## 2022-08-30 RX ORDER — LANOLIN ALCOHOL/MO/W.PET/CERES
1000 CREAM (GRAM) TOPICAL DAILY
COMMUNITY

## 2022-08-30 RX ORDER — FLUPHENAZINE HYDROCHLORIDE 1 MG/1
1 TABLET ORAL WEEKLY
COMMUNITY

## 2022-08-30 RX ORDER — LATANOPROST 50 UG/ML
1 SOLUTION/ DROPS OPHTHALMIC AT BEDTIME
COMMUNITY

## 2022-08-30 RX ORDER — VITAMIN B COMPLEX
1 TABLET ORAL DAILY
COMMUNITY

## 2022-08-31 ENCOUNTER — ANESTHESIA EVENT (OUTPATIENT)
Dept: SURGERY | Facility: CLINIC | Age: 82
End: 2022-08-31
Payer: MEDICARE

## 2022-08-31 RX ORDER — CEPHALEXIN 500 MG/1
500 CAPSULE ORAL 3 TIMES DAILY
COMMUNITY
End: 2024-09-18

## 2022-09-01 ENCOUNTER — HOSPITAL ENCOUNTER (OUTPATIENT)
Facility: CLINIC | Age: 82
Discharge: HOME | End: 2022-09-01
Attending: OBSTETRICS & GYNECOLOGY | Admitting: OBSTETRICS & GYNECOLOGY
Payer: MEDICARE

## 2022-09-01 ENCOUNTER — ANESTHESIA (OUTPATIENT)
Dept: SURGERY | Facility: CLINIC | Age: 82
End: 2022-09-01
Payer: MEDICARE

## 2022-09-01 VITALS
HEART RATE: 72 BPM | TEMPERATURE: 97.3 F | OXYGEN SATURATION: 94 % | WEIGHT: 149.4 LBS | DIASTOLIC BLOOD PRESSURE: 70 MMHG | SYSTOLIC BLOOD PRESSURE: 126 MMHG | RESPIRATION RATE: 14 BRPM | BODY MASS INDEX: 28.21 KG/M2 | HEIGHT: 61 IN

## 2022-09-01 DIAGNOSIS — C51.9 VULVAR CANCER (H): Primary | ICD-10-CM

## 2022-09-01 LAB — POTASSIUM BLD-SCNC: 3.5 MMOL/L (ref 3.4–5.3)

## 2022-09-01 PROCEDURE — 710N000012 HC RECOVERY PHASE 2, PER MINUTE: Performed by: OBSTETRICS & GYNECOLOGY

## 2022-09-01 PROCEDURE — 84132 ASSAY OF SERUM POTASSIUM: CPT | Performed by: ANESTHESIOLOGY

## 2022-09-01 PROCEDURE — 258N000003 HC RX IP 258 OP 636: Performed by: NURSE ANESTHETIST, CERTIFIED REGISTERED

## 2022-09-01 PROCEDURE — 250N000009 HC RX 250: Performed by: NURSE ANESTHETIST, CERTIFIED REGISTERED

## 2022-09-01 PROCEDURE — 258N000003 HC RX IP 258 OP 636: Performed by: OBSTETRICS & GYNECOLOGY

## 2022-09-01 PROCEDURE — 272N000001 HC OR GENERAL SUPPLY STERILE: Performed by: OBSTETRICS & GYNECOLOGY

## 2022-09-01 PROCEDURE — 250N000013 HC RX MED GY IP 250 OP 250 PS 637: Performed by: PHYSICIAN ASSISTANT

## 2022-09-01 PROCEDURE — 250N000011 HC RX IP 250 OP 636: Performed by: NURSE ANESTHETIST, CERTIFIED REGISTERED

## 2022-09-01 PROCEDURE — 370N000017 HC ANESTHESIA TECHNICAL FEE, PER MIN: Performed by: OBSTETRICS & GYNECOLOGY

## 2022-09-01 PROCEDURE — 258N000003 HC RX IP 258 OP 636: Performed by: ANESTHESIOLOGY

## 2022-09-01 PROCEDURE — 250N000011 HC RX IP 250 OP 636: Performed by: OBSTETRICS & GYNECOLOGY

## 2022-09-01 PROCEDURE — 88309 TISSUE EXAM BY PATHOLOGIST: CPT | Mod: TC | Performed by: OBSTETRICS & GYNECOLOGY

## 2022-09-01 PROCEDURE — 250N000009 HC RX 250: Performed by: OBSTETRICS & GYNECOLOGY

## 2022-09-01 PROCEDURE — 36415 COLL VENOUS BLD VENIPUNCTURE: CPT | Performed by: ANESTHESIOLOGY

## 2022-09-01 PROCEDURE — 250N000013 HC RX MED GY IP 250 OP 250 PS 637: Performed by: OBSTETRICS & GYNECOLOGY

## 2022-09-01 PROCEDURE — 360N000075 HC SURGERY LEVEL 2, PER MIN: Performed by: OBSTETRICS & GYNECOLOGY

## 2022-09-01 PROCEDURE — 710N000009 HC RECOVERY PHASE 1, LEVEL 1, PER MIN: Performed by: OBSTETRICS & GYNECOLOGY

## 2022-09-01 PROCEDURE — 999N000141 HC STATISTIC PRE-PROCEDURE NURSING ASSESSMENT: Performed by: OBSTETRICS & GYNECOLOGY

## 2022-09-01 PROCEDURE — 250N000025 HC SEVOFLURANE, PER MIN: Performed by: OBSTETRICS & GYNECOLOGY

## 2022-09-01 RX ORDER — OXYCODONE HYDROCHLORIDE 5 MG/1
2.5-5 TABLET ORAL EVERY 6 HOURS PRN
Qty: 6 TABLET | Refills: 0 | Status: SHIPPED | OUTPATIENT
Start: 2022-09-01 | End: 2024-09-18

## 2022-09-01 RX ORDER — MAGNESIUM HYDROXIDE 1200 MG/15ML
LIQUID ORAL PRN
Status: DISCONTINUED | OUTPATIENT
Start: 2022-09-01 | End: 2022-09-01 | Stop reason: HOSPADM

## 2022-09-01 RX ORDER — PROPOFOL 10 MG/ML
INJECTION, EMULSION INTRAVENOUS PRN
Status: DISCONTINUED | OUTPATIENT
Start: 2022-09-01 | End: 2022-09-01

## 2022-09-01 RX ORDER — FENTANYL CITRATE 50 UG/ML
INJECTION, SOLUTION INTRAMUSCULAR; INTRAVENOUS PRN
Status: DISCONTINUED | OUTPATIENT
Start: 2022-09-01 | End: 2022-09-01

## 2022-09-01 RX ORDER — CLINDAMYCIN PHOSPHATE 900 MG/50ML
900 INJECTION, SOLUTION INTRAVENOUS SEE ADMIN INSTRUCTIONS
Status: DISCONTINUED | OUTPATIENT
Start: 2022-09-01 | End: 2022-09-01 | Stop reason: HOSPADM

## 2022-09-01 RX ORDER — CIPROFLOXACIN 500 MG/1
500 TABLET, FILM COATED ORAL 2 TIMES DAILY
Qty: 14 TABLET | Refills: 0 | Status: SHIPPED | OUTPATIENT
Start: 2022-09-01 | End: 2022-09-08

## 2022-09-01 RX ORDER — GLYCOPYRROLATE 0.2 MG/ML
INJECTION, SOLUTION INTRAMUSCULAR; INTRAVENOUS PRN
Status: DISCONTINUED | OUTPATIENT
Start: 2022-09-01 | End: 2022-09-01

## 2022-09-01 RX ORDER — ACETAMINOPHEN 325 MG/1
975 TABLET ORAL ONCE
Status: DISCONTINUED | OUTPATIENT
Start: 2022-09-01 | End: 2022-09-01 | Stop reason: HOSPADM

## 2022-09-01 RX ORDER — SODIUM CHLORIDE, SODIUM LACTATE, POTASSIUM CHLORIDE, CALCIUM CHLORIDE 600; 310; 30; 20 MG/100ML; MG/100ML; MG/100ML; MG/100ML
INJECTION, SOLUTION INTRAVENOUS CONTINUOUS
Status: DISCONTINUED | OUTPATIENT
Start: 2022-09-01 | End: 2022-09-01 | Stop reason: HOSPADM

## 2022-09-01 RX ORDER — CLINDAMYCIN PHOSPHATE 900 MG/50ML
900 INJECTION, SOLUTION INTRAVENOUS
Status: COMPLETED | OUTPATIENT
Start: 2022-09-01 | End: 2022-09-01

## 2022-09-01 RX ORDER — FENTANYL CITRATE 50 UG/ML
25 INJECTION, SOLUTION INTRAMUSCULAR; INTRAVENOUS
Status: DISCONTINUED | OUTPATIENT
Start: 2022-09-01 | End: 2022-09-01 | Stop reason: HOSPADM

## 2022-09-01 RX ORDER — ONDANSETRON 2 MG/ML
INJECTION INTRAMUSCULAR; INTRAVENOUS PRN
Status: DISCONTINUED | OUTPATIENT
Start: 2022-09-01 | End: 2022-09-01

## 2022-09-01 RX ORDER — LIDOCAINE 40 MG/G
CREAM TOPICAL
Status: DISCONTINUED | OUTPATIENT
Start: 2022-09-01 | End: 2022-09-01 | Stop reason: HOSPADM

## 2022-09-01 RX ORDER — BUPIVACAINE HYDROCHLORIDE AND EPINEPHRINE 5; 5 MG/ML; UG/ML
INJECTION, SOLUTION EPIDURAL; INTRACAUDAL; PERINEURAL
Status: DISCONTINUED
Start: 2022-09-01 | End: 2022-09-01 | Stop reason: HOSPADM

## 2022-09-01 RX ORDER — ONDANSETRON 4 MG/1
4 TABLET, ORALLY DISINTEGRATING ORAL EVERY 30 MIN PRN
Status: DISCONTINUED | OUTPATIENT
Start: 2022-09-01 | End: 2022-09-01 | Stop reason: HOSPADM

## 2022-09-01 RX ORDER — OXYCODONE HYDROCHLORIDE 5 MG/1
5 TABLET ORAL
Status: DISCONTINUED | OUTPATIENT
Start: 2022-09-01 | End: 2022-09-01 | Stop reason: HOSPADM

## 2022-09-01 RX ORDER — GINSENG 100 MG
CAPSULE ORAL
Status: DISCONTINUED
Start: 2022-09-01 | End: 2022-09-01 | Stop reason: HOSPADM

## 2022-09-01 RX ORDER — ONDANSETRON 2 MG/ML
4 INJECTION INTRAMUSCULAR; INTRAVENOUS EVERY 30 MIN PRN
Status: DISCONTINUED | OUTPATIENT
Start: 2022-09-01 | End: 2022-09-01 | Stop reason: HOSPADM

## 2022-09-01 RX ORDER — LIDOCAINE HYDROCHLORIDE 20 MG/ML
INJECTION, SOLUTION INFILTRATION; PERINEURAL PRN
Status: DISCONTINUED | OUTPATIENT
Start: 2022-09-01 | End: 2022-09-01

## 2022-09-01 RX ORDER — EPHEDRINE SULFATE 50 MG/ML
INJECTION, SOLUTION INTRAMUSCULAR; INTRAVENOUS; SUBCUTANEOUS PRN
Status: DISCONTINUED | OUTPATIENT
Start: 2022-09-01 | End: 2022-09-01

## 2022-09-01 RX ORDER — SENNA AND DOCUSATE SODIUM 50; 8.6 MG/1; MG/1
1-2 TABLET, FILM COATED ORAL 2 TIMES DAILY
Qty: 20 TABLET | Refills: 0 | Status: SHIPPED | OUTPATIENT
Start: 2022-09-01 | End: 2024-09-18

## 2022-09-01 RX ORDER — METRONIDAZOLE 500 MG/1
500 TABLET ORAL 2 TIMES DAILY
Qty: 14 TABLET | Refills: 0 | Status: SHIPPED | OUTPATIENT
Start: 2022-09-01 | End: 2022-09-08

## 2022-09-01 RX ORDER — HYDROMORPHONE HCL IN WATER/PF 6 MG/30 ML
0.2 PATIENT CONTROLLED ANALGESIA SYRINGE INTRAVENOUS EVERY 5 MIN PRN
Status: DISCONTINUED | OUTPATIENT
Start: 2022-09-01 | End: 2022-09-01 | Stop reason: HOSPADM

## 2022-09-01 RX ORDER — BUPIVACAINE HYDROCHLORIDE AND EPINEPHRINE 5; 5 MG/ML; UG/ML
INJECTION, SOLUTION PERINEURAL PRN
Status: DISCONTINUED | OUTPATIENT
Start: 2022-09-01 | End: 2022-09-01 | Stop reason: HOSPADM

## 2022-09-01 RX ORDER — FENTANYL CITRATE 50 UG/ML
25 INJECTION, SOLUTION INTRAMUSCULAR; INTRAVENOUS EVERY 5 MIN PRN
Status: DISCONTINUED | OUTPATIENT
Start: 2022-09-01 | End: 2022-09-01 | Stop reason: HOSPADM

## 2022-09-01 RX ORDER — PROPOFOL 10 MG/ML
INJECTION, EMULSION INTRAVENOUS CONTINUOUS PRN
Status: DISCONTINUED | OUTPATIENT
Start: 2022-09-01 | End: 2022-09-01

## 2022-09-01 RX ORDER — ACETAMINOPHEN 325 MG/1
975 TABLET ORAL ONCE
Status: COMPLETED | OUTPATIENT
Start: 2022-09-01 | End: 2022-09-01

## 2022-09-01 RX ORDER — IBUPROFEN 600 MG/1
600 TABLET, FILM COATED ORAL ONCE
Status: COMPLETED | OUTPATIENT
Start: 2022-09-01 | End: 2022-09-01

## 2022-09-01 RX ADMIN — FENTANYL CITRATE 25 MCG: 50 INJECTION, SOLUTION INTRAMUSCULAR; INTRAVENOUS at 08:05

## 2022-09-01 RX ADMIN — GENTAMICIN SULFATE 280 MG: 40 INJECTION, SOLUTION INTRAMUSCULAR; INTRAVENOUS at 07:07

## 2022-09-01 RX ADMIN — Medication 5 MG: at 08:11

## 2022-09-01 RX ADMIN — Medication 5 MG: at 08:20

## 2022-09-01 RX ADMIN — IBUPROFEN 600 MG: 600 TABLET ORAL at 08:58

## 2022-09-01 RX ADMIN — LIDOCAINE HYDROCHLORIDE 40 MG: 20 INJECTION, SOLUTION INFILTRATION; PERINEURAL at 07:49

## 2022-09-01 RX ADMIN — PROPOFOL 20 MCG/KG/MIN: 10 INJECTION, EMULSION INTRAVENOUS at 07:49

## 2022-09-01 RX ADMIN — FENTANYL CITRATE 25 MCG: 50 INJECTION, SOLUTION INTRAMUSCULAR; INTRAVENOUS at 08:04

## 2022-09-01 RX ADMIN — SODIUM CHLORIDE, POTASSIUM CHLORIDE, SODIUM LACTATE AND CALCIUM CHLORIDE: 600; 310; 30; 20 INJECTION, SOLUTION INTRAVENOUS at 06:18

## 2022-09-01 RX ADMIN — PHENYLEPHRINE HYDROCHLORIDE 50 MCG: 10 INJECTION INTRAVENOUS at 08:11

## 2022-09-01 RX ADMIN — GLYCOPYRROLATE 0.1 MG: 0.2 INJECTION, SOLUTION INTRAMUSCULAR; INTRAVENOUS at 08:14

## 2022-09-01 RX ADMIN — PROPOFOL 50 MG: 10 INJECTION, EMULSION INTRAVENOUS at 07:51

## 2022-09-01 RX ADMIN — Medication 5 MG: at 08:09

## 2022-09-01 RX ADMIN — ACETAMINOPHEN 975 MG: 325 TABLET ORAL at 06:05

## 2022-09-01 RX ADMIN — ONDANSETRON 4 MG: 2 INJECTION INTRAMUSCULAR; INTRAVENOUS at 08:13

## 2022-09-01 RX ADMIN — CLINDAMYCIN PHOSPHATE 900 MG: 900 INJECTION, SOLUTION INTRAVENOUS at 06:18

## 2022-09-01 RX ADMIN — PROPOFOL 150 MG: 10 INJECTION, EMULSION INTRAVENOUS at 07:49

## 2022-09-01 ASSESSMENT — ACTIVITIES OF DAILY LIVING (ADL)
ADLS_ACUITY_SCORE: 35
ADLS_ACUITY_SCORE: 35

## 2022-09-01 ASSESSMENT — ENCOUNTER SYMPTOMS: ORTHOPNEA: 0

## 2022-09-01 ASSESSMENT — LIFESTYLE VARIABLES: TOBACCO_USE: 1

## 2022-09-01 NOTE — BRIEF OP NOTE
[facility]    Brief Operative Note    Pre-operative diagnosis: Vulvar cancer (H) [C51.9]  Post-operative diagnosis Same as pre-operative diagnosis    Procedure: Procedure(s):  MODIFIED RADICAL RIGHT ANTERIOR VULVECTOMY  Surgeon: Surgeon(s) and Role:     * Elke Villar MD - Primary  Anesthesia: General   Estimated Blood Loss: 5mL    Drains: None  Specimens:   ID Type Source Tests Collected by Time Destination   1 : MODIFIED RADICAL RIGHT VULVECTOMY- STITCH AT 12 Tissue Vulva SURGICAL PATHOLOGY EXAM Elke Villar MD 9/1/2022  8:11 AM      Findings:   Right vulva with known basal cell carcinoma. No other vulvar abnormalities..  Complications: None.  Implants: * No implants in log *

## 2022-09-01 NOTE — ANESTHESIA CARE TRANSFER NOTE
Patient: Kasia Hernandez    Procedure: Procedure(s):  MODIFIED RADICAL RIGHT ANTERIOR VULVECTOMY       Diagnosis: Vulvar cancer (H) [C51.9]  Diagnosis Additional Information: No value filed.    Anesthesia Type:   General     Note:    Oropharynx: oropharynx clear of all foreign objects and spontaneously breathing  Level of Consciousness: awake  Oxygen Supplementation: room air    Independent Airway: airway patency satisfactory and stable  Dentition: dentition unchanged  Vital Signs Stable: post-procedure vital signs reviewed and stable  Report to RN Given: handoff report given  Patient transferred to: PACU    Handoff Report: Identifed the Patient, Identified the Reponsible Provider, Reviewed the pertinent medical history, Discussed the surgical course, Reviewed Intra-OP anesthesia mangement and issues during anesthesia, Set expectations for post-procedure period and Allowed opportunity for questions and acknowledgement of understanding      Vitals:  Vitals Value Taken Time   BP     Temp     Pulse 93 09/01/22 0837   Resp 13 09/01/22 0837   SpO2 97 % 09/01/22 0837   Vitals shown include unvalidated device data.    Electronically Signed By: MELANIE Tyson CRNA  September 1, 2022  8:38 AM

## 2022-09-01 NOTE — ANESTHESIA POSTPROCEDURE EVALUATION
Patient: Kasia Hernandez    Procedure: Procedure(s):  MODIFIED RADICAL RIGHT ANTERIOR VULVECTOMY       Anesthesia Type:  General    Note:     Postop Pain Control: Uneventful            Sign Out: Well controlled pain   PONV: No   Neuro/Psych: Uneventful            Sign Out: Acceptable/Baseline neuro status   Airway/Respiratory: Uneventful            Sign Out: Acceptable/Baseline resp. status   CV/Hemodynamics: Uneventful            Sign Out: Acceptable CV status   Other NRE: NONE   DID A NON-ROUTINE EVENT OCCUR? No           Last vitals:  Vitals Value Taken Time   /69 09/01/22 0930   Temp 36.2  C (97.2  F) 09/01/22 0836   Pulse 81 09/01/22 0930   Resp 18 09/01/22 0930   SpO2 96 % 09/01/22 0928   Vitals shown include unvalidated device data.    Electronically Signed By: Erin Scherer MD  September 1, 2022  10:48 AM

## 2022-09-01 NOTE — ANESTHESIA PROCEDURE NOTES
Airway       Patient location: [facility] - Operating Room or Procedural Area.  Staff -        Anesthesiologist:  Erin Scherer MD       CRNA: Sachin Medina APRN CRNA       Performed By: CRNA  Consent for Airway        Urgency: elective  Indications and Patient Condition       Indications for airway management: ashly-procedural       Induction type:intravenous       Mask difficulty assessment: 0 - not attempted    Final Airway Details       Final airway type: supraglottic airway    Supraglottic Airway Details        Type: LMA       Brand: Ambu AuraGain       LMA size: 4    Post intubation assessment        Placement verified by: capnometry, equal breath sounds and chest rise        Number of attempts at approach: 1       Number of other approaches attempted: 0       Ease of procedure: easy       Dentition: Intact and Unchanged

## 2022-09-01 NOTE — OP NOTE
Procedure Date: 09/01/2022    PREOPERATIVE DIAGNOSIS:  Basal cell carcinoma of the right vulva.    POSTOPERATIVE DIAGNOSIS:  Basal cell carcinoma of the right vulva.    SURGEON:  Elke Villar MD    ASSISTANT:  Opal Siddiqi PA-C    ANESTHESIA:  General endotracheal anesthesia.    PROCEDURE:  Modified radical right anterior vulvectomy.    INDICATIONS FOR PROCEDURE:  The patient is an 81-year-old female who was seen by Dermatology for evaluation of a painful vulvar lesion on the right vulva of 2 months' duration.  She was referred to Dr. Roper for a biopsy.  On 07/12/2022, she was seen by Dr. Roper.  She was found to have an ulcerated 1 cm lesion to the right of the labia minora at the level of the clitoris.  A vulvar biopsy was obtained and revealed basal cell carcinoma with positive margin.  She was subsequently referred to me and seen me in consultation.  She had a small lesion of the right anterior vulva.  She was brought to the operating room for a modified radical vulvectomy today.    FINDINGS:  The patient had a less than 1 cm lesion involving the anterior vulva that was not ulcerated at this point, it was more heaped up.  The rest of the vulva appeared normal.    DESCRIPTION OF PROCEDURE:  The patient was taken to the operating room.  She had received broad spectrum antibiotic prophylaxis.  Knee high sequential compression devices were placed on her lower extremities.  She was brought to the operating room and placed in a supine position.  General endotracheal anesthesia was administered in the usual fashion.  Once her airway was protected, she was placed in high lithotomy position using well-padded Yellofin stirrups.  Arms were left on arm boards.  She was prepped and draped in the usual sterile fashion.  A timeout was conducted and everyone agreed upon the procedure.  I outlined my area of the anterior modified radical vulvectomy.  I infiltrated this area with 3 mL of 0.5% Marcaine on a 22-gauge  needle.  Once the area was infiltrated, I made an incision with 15 blade in the area that I demarcated to give me hopefully clear margins.  This was taken down through the subcutaneous tissue to the base of the vulva and tapered in towards the base.  It was removed and Opal Siddiqi placed a stitch at 12 o'clock and it was passed off for permanent pathology.  The vulvar defect was closed in a 3-layer closure, all layers using 2-0 Vicryl on an SH needle.  The first layer with a running continuous suture to reapproximate the deep subcutaneous tissue, the second layer was interrupted 2-0 Vicryl sutures, and the third was reapproximation of the skin and mucosa.  This was done with multiple interrupted 2-0 Vicryl sutures. A pad was laid over the incision.  Her anesthesia was reversed.  She was extubated and taken to recovery room in stable condition.  Estimated blood loss for the procedure was 10 mL.    Opal Siddiqi was my primary assist.  She helped me with retraction during the case.  She helped me with tagging the suture and helped by cutting the sutures as we closed the defect.    Elke Villar MD        D: 2022   T: 2022   MT: md    Name:     NAUN SMITH  MRN:      [id number]        Account:        [id number]   :      1940           Procedure Date: 2022     Document: [id number]    cc:  MD Ulysses Mueller MD

## 2022-09-01 NOTE — DISCHARGE INSTRUCTIONS
Today you were given 975 mg of Tylenol at 6 am. The recommended daily maximum dose is 4000 mg.          Today you received Toradol, an antiinflammatory medication similar to Ibuprofen.  You should not take other antiinflammatory medication, such as Ibuprofen, Motrin, Advil, Aleve, Naprosyn, etc until 3 pm.          Same Day Surgery Discharge Instructions for  Sedation and General Anesthesia     It's not unusual to feel dizzy, light-headed or faint for up to 24 hours after surgery or while taking pain medication.  If you have these symptoms: sit for a few minutes before standing and have someone assist you when you get up to walk or use the bathroom.    You should rest and relax for the next 24 hours. We recommend you make arrangements to have an adult stay with you for at least 24 hours after your discharge.  Avoid hazardous and strenuous activity.    DO NOT DRIVE any vehicle or operate mechanical equipment for 24 hours following the end of your surgery.  Even though you may feel normal, your reactions may be affected by the medication you have received.    Do not drink alcoholic beverages for 24 hours following surgery.     Slowly progress to your regular diet as you feel able. It's not unusual to feel nauseated and/or vomit after receiving anesthesia.  If you develop these symptoms, drink clear liquids (apple juice, ginger ale, broth, 7-up, etc. ) until you feel better.  If your nausea and vomiting persists for 24 hours, please notify your surgeon.      All narcotic pain medications, along with inactivity and anesthesia, can cause constipation. Drinking plenty of liquids and increasing fiber intake will help.    For any questions of a medical nature, call your surgeon.    Do not make important decisions for 24 hours.    If you had general anesthesia, you may have a sore throat for a couple of days related to the breathing tube used during surgery.  You may use Cepacol lozenges to help with this discomfort.  If it  worsens or if you develop a fever, contact your surgeon.     If you feel your pain is not well managed with the pain medications prescribed by your surgeon, please contact your surgeon's office to let them know so they can address your concerns.           **If you have questions or concerns about your procedure,   call Dr. Villar [phone]**

## 2022-09-01 NOTE — OR NURSING
Patient brought a picture of home covid antigen test on phone as directed.  I personally saw this result and it was time stamped 8/30/22..  Result was neg.

## 2022-09-01 NOTE — ANESTHESIA PREPROCEDURE EVALUATION
Anesthesia Pre-Procedure Evaluation    Patient: Kasia Hernandez   MRN: [id number] : 1940        Procedure : Procedure(s):  MODIFIED RADICAL RIGHT ANTERIOR VULVECTOMY ; PUNCH BIOPSY OF VULVA; POSSIBLE SIMPLE VULVECTOMY          Past Medical History:   Diagnosis Date     BCC (basal cell carcinoma of skin)      Glaucoma      High cholesterol      Hypertension      Hypothyroidism      Mood disorder (H)      Osteopenia      Thyroid disease      Vitamin B12 deficiency (non anemic)      Vulvar cancer (H)       Past Surgical History:   Procedure Laterality Date     BUNIONECTOMY       CATARACT IOL, RT/LT       D & C       EXCISE LESION EYELID Right 2020    Procedure: EXCISION RIGHT LATERAL CANTHUS LESION WITH FROZEN SECTION CONTROL;  Surgeon: Higinio Cross MD;  Location: [facility] OR     REMOVE HARDWARE FOOT Right      REPLACEMENT TOTAL KNEE Left       Allergies   Allergen Reactions     Lisinopril Cough     Amoxicillin Rash      Social History     Tobacco Use     Smoking status: Never Smoker     Smokeless tobacco: Never Used   Substance Use Topics     Alcohol use: Not on file     Comment: 1 q week      Wt Readings from Last 1 Encounters:   22 67.8 kg (149 lb 6.4 oz)        Anesthesia Evaluation   Pt has had prior anesthetic.     No history of anesthetic complications       ROS/MED HX  ENT/Pulmonary:     (+) tobacco use, Past use,  (-) asthma and sleep apnea   Neurologic:       Cardiovascular:     (+) Dyslipidemia hypertension----- (-) LERNER, orthopnea/PND and syncope   METS/Exercise Tolerance: >4 METS    Hematologic:    (-) history of blood clots   Musculoskeletal:       GI/Hepatic:    (-) GERD   Renal/Genitourinary:    (-) renal disease   Endo:     (+) thyroid problem,  (-) Type II DM   Psychiatric/Substance Use:       Infectious Disease:    (-) Recent Fever   Malignancy: Comment: vulvar  (+) Malignancy,     Other: Comment: Glaucoma            Physical Exam    Airway        Mallampati: II   TM distance:  > 3 FB   Neck ROM: full   Mouth opening: > 3 cm    Respiratory Devices and Support         Dental       (+) caps      Cardiovascular          Rhythm and rate: regular     Pulmonary           breath sounds clear to auscultation           OUTSIDE LABS:  CBC: No results found for: WBC, HGB, HCT, PLT  BMP:   Lab Results   Component Value Date    POTASSIUM 3.5 09/01/2022     COAGS: No results found for: PTT, INR, FIBR  POC: No results found for: BGM, HCG, HCGS  HEPATIC: No results found for: ALBUMIN, PROTTOTAL, ALT, AST, GGT, ALKPHOS, BILITOTAL, BILIDIRECT, NU  OTHER: No results found for: PH, LACT, A1C, KATHY, PHOS, MAG, LIPASE, AMYLASE, TSH, T4, T3, CRP, SED    Anesthesia Plan    ASA Status:  2      Anesthesia Type: General.              Consents    Anesthesia Plan(s) and associated risks, benefits, and realistic alternatives discussed. Questions answered and patient/representative(s) expressed understanding.    - Discussed:     - Discussed with:  Patient         Postoperative Care       PONV prophylaxis: Ondansetron (or other 5HT-3), Background Propofol Infusion     Comments:           Prior to Admission medications    Medication Sig Start Date End Date Taking? Authorizing Provider   aMILoride-hydrochlorothiazide (MODURETIC) 5-50 MG TABS Take 1 tablet by mouth every morning   Yes Reported, Patient   amLODIPine (NORVASC) 5 MG tablet Take 5 mg by mouth every morning   Yes Reported, Patient   atorvastatin (LIPITOR) 20 MG tablet Take 20 mg by mouth At Bedtime   Yes Reported, Patient   cephALEXin (KEFLEX) 500 MG capsule Take 500 mg by mouth 3 times daily   Yes Reported, Patient   clindamycin (CLEOCIN) 300 MG capsule Take 600 mg by mouth daily as needed (2 X 300 mg) 1 hour prior to dental appointments.   Yes Reported, Patient   cyanocobalamin (VITAMIN B-12) 1000 MCG tablet Take 1,000 mcg by mouth daily   Yes Reported, Patient   fluPHENAZine (PROLIXIN) 1 MG tablet Take 1 mg by mouth once a week   Yes Reported, Patient    latanoprost (XALATAN) 0.005 % ophthalmic solution Place 1 drop into both eyes At Bedtime   Yes Reported, Patient   levothyroxine (SYNTHROID/LEVOTHROID) 88 MCG tablet Take 88 mcg by mouth daily Use 2 extra doses weekly as directed.   Yes Reported, Patient   losartan (COZAAR) 100 MG tablet Take 100 mg by mouth every morning 8/1/18  Yes Reported, Patient   metoprolol succinate ER (TOPROL XL) 50 MG 24 hr tablet Take 25 mg by mouth every morning (0.5 X 50 mg)   Yes Reported, Patient   Vitamin D3 (CHOLECALCIFEROL) 25 mcg (1000 units) tablet Take 1 tablet by mouth daily   Yes Reported, Patient   travoprost MIR FREE (TRAVATAN Z) 0.004 % ophthalmic solution Place 1 drop into both eyes At Bedtime 4/30/19   Alivia Davalos MD     Current Facility-Administered Medications Ordered in Epic   Medication Dose Route Frequency Last Rate Last Admin     bupivacaine 0.5% - EPINEPHrine 1:200,000 (PF) 0.5% -1:[id number] injection             clindamycin (CLEOCIN) infusion 900 mg  900 mg Intravenous See Admin Instructions         gentamicin (GARAMYCIN) 280 mg in sodium chloride 0.9 % 50 mL intermittent infusion  5 mg/kg (Adjusted) Intravenous Pre-Op/Pre-procedure x 1 dose         lactated ringers infusion   Intravenous Continuous 100 mL/hr at 09/01/22 0618 New Bag at 09/01/22 0618     lidocaine (LMX4) cream   Topical Q1H PRN         lidocaine 1 % 0.1-1 mL  0.1-1 mL Other Q1H PRN         sodium chloride (PF) 0.9% PF flush 3 mL  3 mL Intracatheter Q8H         sodium chloride (PF) 0.9% PF flush 3 mL  3 mL Intracatheter q1 min prn         No current [facility]-ordered outpatient medications on file.       lactated ringers 100 mL/hr at 09/01/22 0618     No results for input(s): ABO, RH in the last 53305 hours.  No results for input(s): HCG in the last 60529 hours.  No results found for this or any previous visit (from the past 744 hour(s)).         Erin Scherer MD

## 2022-09-02 LAB
PATH REPORT.COMMENTS IMP SPEC: ABNORMAL
PATH REPORT.COMMENTS IMP SPEC: YES
PATH REPORT.FINAL DX SPEC: ABNORMAL
PATH REPORT.GROSS SPEC: ABNORMAL
PATH REPORT.MICROSCOPIC SPEC OTHER STN: ABNORMAL
PATH REPORT.RELEVANT HX SPEC: ABNORMAL
PHOTO IMAGE: ABNORMAL

## 2022-09-02 PROCEDURE — 88309 TISSUE EXAM BY PATHOLOGIST: CPT | Mod: 26 | Performed by: PATHOLOGY

## 2022-10-26 ENCOUNTER — APPOINTMENT (OUTPATIENT)
Dept: URBAN - METROPOLITAN AREA CLINIC 256 | Age: 82
Setting detail: DERMATOLOGY
End: 2022-10-26

## 2022-10-26 VITALS — WEIGHT: 147 LBS | HEIGHT: 62 IN

## 2022-10-26 DIAGNOSIS — D485 NEOPLASM OF UNCERTAIN BEHAVIOR OF SKIN: ICD-10-CM

## 2022-10-26 DIAGNOSIS — L57.0 ACTINIC KERATOSIS: ICD-10-CM

## 2022-10-26 PROBLEM — D48.5 NEOPLASM OF UNCERTAIN BEHAVIOR OF SKIN: Status: ACTIVE | Noted: 2022-10-26

## 2022-10-26 PROCEDURE — OTHER LIQUID NITROGEN: OTHER

## 2022-10-26 PROCEDURE — OTHER COUNSELING: OTHER

## 2022-10-26 PROCEDURE — 17004 DESTROY PREMAL LESIONS 15/>: CPT

## 2022-10-26 PROCEDURE — 11102 TANGNTL BX SKIN SINGLE LES: CPT | Mod: 59

## 2022-10-26 PROCEDURE — OTHER BIOPSY BY SHAVE METHOD: OTHER

## 2022-10-26 PROCEDURE — OTHER MIPS QUALITY: OTHER

## 2022-10-26 ASSESSMENT — LOCATION DETAILED DESCRIPTION DERM
LOCATION DETAILED: LEFT LATERAL TEMPLE
LOCATION DETAILED: RIGHT DISTAL DORSAL FOREARM
LOCATION DETAILED: RIGHT SUPERIOR CENTRAL BUCCAL CHEEK
LOCATION DETAILED: RIGHT INFERIOR CENTRAL MALAR CHEEK
LOCATION DETAILED: LEFT LATERAL MALAR CHEEK
LOCATION DETAILED: RIGHT SUPERIOR LATERAL NECK
LOCATION DETAILED: LEFT SUPERIOR LATERAL NECK
LOCATION DETAILED: LEFT CENTRAL MANDIBULAR CHEEK
LOCATION DETAILED: RIGHT UPPER CUTANEOUS LIP
LOCATION DETAILED: RIGHT INFERIOR POSTAURICULAR SKIN
LOCATION DETAILED: LEFT CENTRAL MALAR CHEEK

## 2022-10-26 ASSESSMENT — LOCATION ZONE DERM
LOCATION ZONE: LIP
LOCATION ZONE: ARM
LOCATION ZONE: NECK
LOCATION ZONE: SCALP
LOCATION ZONE: FACE

## 2022-10-26 ASSESSMENT — LOCATION SIMPLE DESCRIPTION DERM
LOCATION SIMPLE: NECK
LOCATION SIMPLE: LEFT CHEEK
LOCATION SIMPLE: SCALP
LOCATION SIMPLE: RIGHT FOREARM
LOCATION SIMPLE: RIGHT CHEEK
LOCATION SIMPLE: RIGHT LIP
LOCATION SIMPLE: LEFT TEMPLE

## 2022-10-26 NOTE — HPI: SKIN LESION
What Type Of Note Output Would You Prefer (Optional)?: Standard Output
How Severe Is Your Skin Lesion?: mild
Has Your Skin Lesion Been Treated?: not been treated
Is This A New Presentation, Or A Follow-Up?: Skin Lesions
Additional History: Patient had a recent surgery for BCC of the right vulva.

## 2022-10-26 NOTE — PROCEDURE: MIPS QUALITY
Quality 130: Documentation Of Current Medications In The Medical Record: Current Medications Documented
Quality 226: Preventive Care And Screening: Tobacco Use: Screening And Cessation Intervention: Patient screened for tobacco use and is an ex/non-smoker
Quality 111:Pneumonia Vaccination Status For Older Adults: Pneumococcal vaccine (PPSV23) administered on or after patient’s 60th birthday and before the end of the measurement period
Detail Level: Detailed
Quality 110: Preventive Care And Screening: Influenza Immunization: Influenza Immunization previously received during influenza season
Quality 431: Preventive Care And Screening: Unhealthy Alcohol Use - Screening: Patient not identified as an unhealthy alcohol user when screened for unhealthy alcohol use using a systematic screening method

## 2022-10-26 NOTE — PROCEDURE: BIOPSY BY SHAVE METHOD
Render In Bullet Format When Appropriate: No
Billing Type: Third-Party Bill
Silver Nitrate Text: The wound bed was treated with silver nitrate after the biopsy was performed.
Consent: Written consent was obtained and risks were reviewed including but not limited to scarring, infection, bleeding, scabbing, incomplete removal, nerve damage and allergy to anesthesia.
Anesthesia Type: 1% lidocaine with epinephrine
Hemostasis: Drysol
X Size Of Lesion In Cm: 0
Depth Of Biopsy: dermis
Cryotherapy Text: The wound bed was treated with cryotherapy after the biopsy was performed.
Biopsy Method: Dermablade
Path Notes Override (Will Replace All Of The Above Text): Please check margins
Post-Care Instructions: I reviewed with the patient in detail post-care instructions. Patient is to keep the biopsy site dry overnight, and then apply bacitracin twice daily until healed. Patient may apply hydrogen peroxide soaks to remove any crusting.
Anticipated Plan (Based On Presumed Biopsy Results): EDC
Electrodesiccation Text: The wound bed was treated with electrodesiccation after the biopsy was performed.
Was A Bandage Applied: Yes
Curettage Text: The wound bed was treated with curettage after the biopsy was performed.
Notification Instructions: Patient will be notified of biopsy results. However, patient instructed to call the office if not contacted within 2 weeks.
Wound Care: Petrolatum
Electrodesiccation And Curettage Text: The wound bed was treated with electrodesiccation and curettage after the biopsy was performed.
Biopsy Type: H and E
Type Of Destruction Used: Curettage
Information: Selecting Yes will display possible errors in your note based on the variables you have selected. This validation is only offered as a suggestion for you. PLEASE NOTE THAT THE VALIDATION TEXT WILL BE REMOVED WHEN YOU FINALIZE YOUR NOTE. IF YOU WANT TO FAX A PRELIMINARY NOTE YOU WILL NEED TO TOGGLE THIS TO 'NO' IF YOU DO NOT WANT IT IN YOUR FAXED NOTE.
Dressing: bandage
Anesthesia Volume In Cc (Will Not Render If 0): 0.5
Detail Level: Detailed

## 2022-10-26 NOTE — PROCEDURE: LIQUID NITROGEN
Detail Level: Detailed
Duration Of Freeze Thaw-Cycle (Seconds): 4
Show Aperture Variable?: Yes
Render Note In Bullet Format When Appropriate: No
Number Of Freeze-Thaw Cycles: 1 freeze-thaw cycle
Post-Care Instructions: I reviewed with the patient in detail post-care instructions. Patient is to wear sunprotection, and avoid picking at any of the treated lesions. Pt may apply Vaseline to crusted or scabbing areas.
Consent: The patient's consent was obtained including but not limited to risks of crusting, scabbing, blistering, scarring, darker or lighter pigmentary change, recurrence, incomplete removal and infection.

## 2023-08-01 ENCOUNTER — APPOINTMENT (OUTPATIENT)
Dept: URBAN - METROPOLITAN AREA CLINIC 256 | Age: 83
Setting detail: DERMATOLOGY
End: 2023-08-01

## 2023-08-01 VITALS — HEIGHT: 62 IN | WEIGHT: 145 LBS

## 2023-08-01 DIAGNOSIS — Z71.89 OTHER SPECIFIED COUNSELING: ICD-10-CM

## 2023-08-01 DIAGNOSIS — L82.0 INFLAMED SEBORRHEIC KERATOSIS: ICD-10-CM

## 2023-08-01 DIAGNOSIS — L57.8 OTHER SKIN CHANGES DUE TO CHRONIC EXPOSURE TO NONIONIZING RADIATION: ICD-10-CM

## 2023-08-01 DIAGNOSIS — L57.0 ACTINIC KERATOSIS: ICD-10-CM

## 2023-08-01 DIAGNOSIS — L82.1 OTHER SEBORRHEIC KERATOSIS: ICD-10-CM

## 2023-08-01 DIAGNOSIS — D22 MELANOCYTIC NEVI: ICD-10-CM

## 2023-08-01 DIAGNOSIS — D18.0 HEMANGIOMA: ICD-10-CM

## 2023-08-01 PROBLEM — D22.62 MELANOCYTIC NEVI OF LEFT UPPER LIMB, INCLUDING SHOULDER: Status: ACTIVE | Noted: 2023-08-01

## 2023-08-01 PROBLEM — D22.5 MELANOCYTIC NEVI OF TRUNK: Status: ACTIVE | Noted: 2023-08-01

## 2023-08-01 PROBLEM — D22.71 MELANOCYTIC NEVI OF RIGHT LOWER LIMB, INCLUDING HIP: Status: ACTIVE | Noted: 2023-08-01

## 2023-08-01 PROBLEM — D22.72 MELANOCYTIC NEVI OF LEFT LOWER LIMB, INCLUDING HIP: Status: ACTIVE | Noted: 2023-08-01

## 2023-08-01 PROBLEM — D18.01 HEMANGIOMA OF SKIN AND SUBCUTANEOUS TISSUE: Status: ACTIVE | Noted: 2023-08-01

## 2023-08-01 PROBLEM — D22.61 MELANOCYTIC NEVI OF RIGHT UPPER LIMB, INCLUDING SHOULDER: Status: ACTIVE | Noted: 2023-08-01

## 2023-08-01 PROBLEM — C44.311 BASAL CELL CARCINOMA OF SKIN OF NOSE: Status: ACTIVE | Noted: 2023-08-01

## 2023-08-01 PROCEDURE — OTHER PRESCRIPTION: OTHER

## 2023-08-01 PROCEDURE — OTHER MIPS QUALITY: OTHER

## 2023-08-01 PROCEDURE — 17003 DESTRUCT PREMALG LES 2-14: CPT | Mod: 59

## 2023-08-01 PROCEDURE — OTHER PRESCRIPTION MEDICATION MANAGEMENT: OTHER

## 2023-08-01 PROCEDURE — 17110 DESTRUCT B9 LESION 1-14: CPT

## 2023-08-01 PROCEDURE — 99213 OFFICE O/P EST LOW 20 MIN: CPT | Mod: 25

## 2023-08-01 PROCEDURE — OTHER COUNSELING: OTHER

## 2023-08-01 PROCEDURE — OTHER EDUCATIONAL RESOURCES PROVIDED: OTHER

## 2023-08-01 PROCEDURE — 17000 DESTRUCT PREMALG LESION: CPT | Mod: 59

## 2023-08-01 PROCEDURE — OTHER LIQUID NITROGEN: OTHER

## 2023-08-01 RX ORDER — IMIQUIMOD 12.5 MG/.25G
5% CREAM TOPICAL QD
Qty: 12 | Refills: 0 | Status: ERX | COMMUNITY
Start: 2023-08-01

## 2023-08-01 ASSESSMENT — LOCATION DETAILED DESCRIPTION DERM
LOCATION DETAILED: LEFT SUPERIOR PREAURICULAR CHEEK
LOCATION DETAILED: LEFT MID PREAURICULAR CHEEK
LOCATION DETAILED: LEFT MEDIAL UPPER BACK
LOCATION DETAILED: 3RD WEB SPACE LEFT HAND
LOCATION DETAILED: LEFT INFERIOR LATERAL BUCCAL CHEEK
LOCATION DETAILED: LEFT VENTRAL PROXIMAL FOREARM
LOCATION DETAILED: LEFT VENTRAL DISTAL FOREARM
LOCATION DETAILED: LEFT CENTRAL LATERAL NECK
LOCATION DETAILED: LEFT SUPERIOR LATERAL NECK
LOCATION DETAILED: LEFT ANTECUBITAL SKIN
LOCATION DETAILED: RIGHT ANTERIOR DISTAL THIGH
LOCATION DETAILED: RIGHT DORSAL FOOT
LOCATION DETAILED: LEFT INFERIOR CENTRAL MALAR CHEEK
LOCATION DETAILED: LEFT DORSAL INDEX FINGER METACARPOPHALANGEAL JOINT
LOCATION DETAILED: INFERIOR THORACIC SPINE
LOCATION DETAILED: RIGHT SUPERIOR LATERAL NECK
LOCATION DETAILED: RIGHT VENTRAL PROXIMAL FOREARM
LOCATION DETAILED: LEFT INFERIOR LATERAL MIDBACK
LOCATION DETAILED: SUPERIOR THORACIC SPINE
LOCATION DETAILED: LEFT ANTERIOR DISTAL THIGH
LOCATION DETAILED: RIGHT VENTRAL DISTAL FOREARM
LOCATION DETAILED: RIGHT PROXIMAL DORSAL FOREARM
LOCATION DETAILED: LEFT PROXIMAL DORSAL MIDDLE FINGER

## 2023-08-01 ASSESSMENT — LOCATION SIMPLE DESCRIPTION DERM
LOCATION SIMPLE: LEFT MIDDLE FINGER
LOCATION SIMPLE: LEFT UPPER ARM
LOCATION SIMPLE: RIGHT FOREARM
LOCATION SIMPLE: LEFT THIGH
LOCATION SIMPLE: NECK
LOCATION SIMPLE: RIGHT FOOT
LOCATION SIMPLE: UPPER BACK
LOCATION SIMPLE: LEFT CHEEK
LOCATION SIMPLE: LEFT HAND
LOCATION SIMPLE: LEFT LOWER BACK
LOCATION SIMPLE: LEFT UPPER BACK
LOCATION SIMPLE: LEFT FOREARM
LOCATION SIMPLE: RIGHT THIGH

## 2023-08-01 ASSESSMENT — LOCATION ZONE DERM
LOCATION ZONE: FINGER
LOCATION ZONE: ARM
LOCATION ZONE: TRUNK
LOCATION ZONE: LEG
LOCATION ZONE: FEET
LOCATION ZONE: FACE
LOCATION ZONE: NECK
LOCATION ZONE: HAND

## 2023-08-01 NOTE — PROCEDURE: PRESCRIPTION MEDICATION MANAGEMENT
Detail Level: Zone
Initiate Treatment: Aldara 5% topical cream QD x3 weeks
Render In Strict Bullet Format?: No

## 2023-08-01 NOTE — PROCEDURE: LIQUID NITROGEN
Post-Care Instructions: I reviewed with the patient in detail post-care instructions. Patient is to wear sunprotection, and avoid picking at any of the treated lesions. Pt may apply Vaseline to crusted or scabbing areas.
Render Note In Bullet Format When Appropriate: No
Application Tool (Optional): Liquid Nitrogen Sprayer
Number Of Freeze-Thaw Cycles: 1 freeze-thaw cycle
Show Aperture Variable?: Yes
Duration Of Freeze Thaw-Cycle (Seconds): 5
Detail Level: Detailed
Consent: The patient's consent was obtained including but not limited to risks of crusting, scabbing, blistering, scarring, darker or lighter pigmentary change, recurrence, incomplete removal and infection.
Spray Paint Text: The liquid nitrogen was applied to the skin utilizing a spray paint frosting technique.
Duration Of Freeze Thaw-Cycle (Seconds): 5-10
Medical Necessity Information: It is in your best interest to select a reason for this procedure from the list below. All of these items fulfill various CMS LCD requirements except the new and changing color options.
Medical Necessity Clause: This procedure was medically necessary because the lesions that were treated were:
Number Of Freeze-Thaw Cycles: 2 freeze-thaw cycles
Consent: The patient's verbal consent was obtained including but not limited to risks of crusting, scabbing, blistering, scarring, darker or lighter pigmentary change, recurrence, incomplete removal and infection.

## 2023-08-01 NOTE — HPI: FULL BODY SKIN EXAMINATION
How Severe Are Your Spot(S)?: mild
What Type Of Note Output Would You Prefer (Optional)?: Standard Output
What Is The Reason For Today's Visit?: Full Body Skin Examination
What Is The Reason For Today's Visit? (Being Monitored For X): the development of new lesions
Additional History: Patient reports that she has noticed more scaly lesions in the last few weeks that she would like evaluated. She would like to do a FBSE as she has not had one in a little over a year.

## 2023-08-01 NOTE — PROCEDURE: MIPS QUALITY
Quality 111:Pneumonia Vaccination Status For Older Adults: Patient did not receive any pneumococcal conjugate or polysaccharide vaccine on or after their 60th birthday and before the end of the measurement period
Quality 130: Documentation Of Current Medications In The Medical Record: Current Medications Documented
Detail Level: Detailed
Quality 431: Preventive Care And Screening: Unhealthy Alcohol Use - Screening: Patient not identified as an unhealthy alcohol user when screened for unhealthy alcohol use using a systematic screening method
Quality 47: Advance Care Plan: Advance Care Planning discussed and documented; advance care plan or surrogate decision maker documented in the medical record.
Quality 110: Preventive Care And Screening: Influenza Immunization: Influenza Immunization previously received during influenza season
Quality 226: Preventive Care And Screening: Tobacco Use: Screening And Cessation Intervention: Patient screened for tobacco use and is an ex/non-smoker

## 2023-08-28 ENCOUNTER — APPOINTMENT (OUTPATIENT)
Dept: URBAN - METROPOLITAN AREA CLINIC 256 | Age: 83
Setting detail: DERMATOLOGY
End: 2023-08-28

## 2023-08-28 DIAGNOSIS — L24.4 IRRITANT CONTACT DERMATITIS DUE TO DRUGS IN CONTACT WITH SKIN: ICD-10-CM

## 2023-08-28 DIAGNOSIS — L82.0 INFLAMED SEBORRHEIC KERATOSIS: ICD-10-CM

## 2023-08-28 PROBLEM — C44.91 BASAL CELL CARCINOMA OF SKIN, UNSPECIFIED: Status: ACTIVE | Noted: 2023-08-28

## 2023-08-28 PROCEDURE — OTHER PRESCRIPTION MEDICATION MANAGEMENT: OTHER

## 2023-08-28 PROCEDURE — 17110 DESTRUCT B9 LESION 1-14: CPT

## 2023-08-28 PROCEDURE — OTHER PHOTO-DOCUMENTATION: OTHER

## 2023-08-28 PROCEDURE — OTHER LIQUID NITROGEN: OTHER

## 2023-08-28 PROCEDURE — OTHER MIPS QUALITY: OTHER

## 2023-08-28 PROCEDURE — 99213 OFFICE O/P EST LOW 20 MIN: CPT | Mod: 25

## 2023-08-28 PROCEDURE — OTHER COUNSELING: OTHER

## 2023-08-28 ASSESSMENT — LOCATION ZONE DERM: LOCATION ZONE: ARM

## 2023-08-28 ASSESSMENT — LOCATION SIMPLE DESCRIPTION DERM: LOCATION SIMPLE: RIGHT POSTERIOR UPPER ARM

## 2023-08-28 ASSESSMENT — LOCATION DETAILED DESCRIPTION DERM: LOCATION DETAILED: RIGHT DISTAL POSTERIOR UPPER ARM

## 2023-08-28 NOTE — PROCEDURE: LIQUID NITROGEN
Medical Necessity Information: It is in your best interest to select a reason for this procedure from the list below. All of these items fulfill various CMS LCD requirements except the new and changing color options.
Duration Of Freeze Thaw-Cycle (Seconds): 3
Show Applicator Variable?: Yes
Number Of Freeze-Thaw Cycles: 3 freeze-thaw cycles
Post-Care Instructions: I reviewed with the patient in detail post-care instructions. Patient is to wear sunprotection, and avoid picking at any of the treated lesions. Pt may apply Vaseline to crusted or scabbing areas.
Render Post-Care Instructions In Note?: no
Detail Level: Simple
Medical Necessity Clause: This procedure was medically necessary because the lesions that were treated were:
Spray Paint Text: The liquid nitrogen was applied to the skin utilizing a spray paint frosting technique.
Consent: The patient's consent was obtained including but not limited to risks of crusting, scabbing, blistering, scarring, darker or lighter pigmentary change, recurrence, incomplete removal and infection.

## 2023-08-28 NOTE — PROCEDURE: PRESCRIPTION MEDICATION MANAGEMENT
Discontinue Regimen: Imiquimod 5% Topical Cream
Render In Strict Bullet Format?: No
Detail Level: Zone

## 2023-09-26 ENCOUNTER — APPOINTMENT (OUTPATIENT)
Dept: URBAN - METROPOLITAN AREA CLINIC 256 | Age: 83
Setting detail: DERMATOLOGY
End: 2023-09-26

## 2023-09-26 DIAGNOSIS — L57.0 ACTINIC KERATOSIS: ICD-10-CM

## 2023-09-26 DIAGNOSIS — Z85.828 PERSONAL HISTORY OF OTHER MALIGNANT NEOPLASM OF SKIN: ICD-10-CM

## 2023-09-26 PROCEDURE — OTHER COUNSELING: OTHER

## 2023-09-26 PROCEDURE — OTHER LIQUID NITROGEN: OTHER

## 2023-09-26 PROCEDURE — OTHER MIPS QUALITY: OTHER

## 2023-09-26 PROCEDURE — 17000 DESTRUCT PREMALG LESION: CPT

## 2023-09-26 PROCEDURE — 99212 OFFICE O/P EST SF 10 MIN: CPT | Mod: 25

## 2023-09-26 PROCEDURE — OTHER PHOTO-DOCUMENTATION: OTHER

## 2023-09-26 ASSESSMENT — LOCATION SIMPLE DESCRIPTION DERM
LOCATION SIMPLE: NOSE
LOCATION SIMPLE: LEFT CHEEK

## 2023-09-26 ASSESSMENT — LOCATION ZONE DERM
LOCATION ZONE: FACE
LOCATION ZONE: NOSE

## 2023-09-26 ASSESSMENT — LOCATION DETAILED DESCRIPTION DERM
LOCATION DETAILED: LEFT INFERIOR MEDIAL MALAR CHEEK
LOCATION DETAILED: LEFT NASAL DORSUM

## 2023-09-26 NOTE — PROCEDURE: LIQUID NITROGEN
Consent: The patient's consent was obtained including but not limited to risks of crusting, scabbing, blistering, scarring, darker or lighter pigmentary change, recurrence, incomplete removal and infection.
Detail Level: Simple
Render Post-Care Instructions In Note?: no
Show Aperture Variable?: Yes
Number Of Freeze-Thaw Cycles: 1 freeze-thaw cycle
Post-Care Instructions: I reviewed with the patient in detail post-care instructions. Patient is to wear sunprotection, and avoid picking at any of the treated lesions. Pt may apply Vaseline to crusted or scabbing areas.
Duration Of Freeze Thaw-Cycle (Seconds): 3

## 2024-08-27 ENCOUNTER — HOSPITAL ENCOUNTER (OUTPATIENT)
Dept: CT IMAGING | Facility: CLINIC | Age: 84
Discharge: HOME | End: 2024-08-27
Attending: OBSTETRICS & GYNECOLOGY | Admitting: OBSTETRICS & GYNECOLOGY
Payer: MEDICARE

## 2024-08-27 DIAGNOSIS — C54.1 MALIGNANT NEOPLASM OF ENDOMETRIUM (H): ICD-10-CM

## 2024-08-27 LAB
CREAT BLD-MCNC: 0.7 MG/DL (ref 0.5–1)
EGFRCR SERPLBLD CKD-EPI 2021: >60 ML/MIN/1.73M2

## 2024-08-27 PROCEDURE — 250N000009 HC RX 250: Performed by: OBSTETRICS & GYNECOLOGY

## 2024-08-27 PROCEDURE — 71260 CT THORAX DX C+: CPT

## 2024-08-27 PROCEDURE — 250N000011 HC RX IP 250 OP 636: Performed by: OBSTETRICS & GYNECOLOGY

## 2024-08-27 PROCEDURE — 82565 ASSAY OF CREATININE: CPT

## 2024-08-27 RX ORDER — IOPAMIDOL 755 MG/ML
74 INJECTION, SOLUTION INTRAVASCULAR ONCE
Status: COMPLETED | OUTPATIENT
Start: 2024-08-27 | End: 2024-08-27

## 2024-08-27 RX ADMIN — IOPAMIDOL 74 ML: 755 INJECTION, SOLUTION INTRAVENOUS at 12:46

## 2024-08-27 RX ADMIN — SODIUM CHLORIDE 61 ML: 9 INJECTION, SOLUTION INTRAVENOUS at 12:46

## 2024-09-18 RX ORDER — MULTIVITAMIN
1 TABLET ORAL DAILY
COMMUNITY

## 2024-09-18 RX ORDER — ACETAMINOPHEN 500 MG
500-1000 TABLET ORAL EVERY 6 HOURS PRN
COMMUNITY

## 2024-09-18 RX ORDER — SPIRONOLACTONE 25 MG/1
25 TABLET ORAL DAILY
COMMUNITY

## 2024-09-18 NOTE — PROGRESS NOTES
PTA medications updated by Medication Scribe prior to surgery via phone call with patient (last doses completed by Nurse)     Medication history sources: Patient, Surescripts, and H&P  In the past week, patient estimated taking medication this percent of the time: Greater than 90%      Significant changes made to the medication list:  Patient reports no longer taking the following meds (med scribe removed from PTA med list): MODURECTIC,OXYCODONE,SENNA,KEFLEX,TRAVOPROST      Additional medication history information:   Patient was advised to bring: XALATAN    Medication reconciliation completed by provider prior to medication history? No    Time spent in this activity: 45 MINUTES    The information provided in this note is only as accurate as the sources available at the time of update(s)      Prior to Admission medications    Medication Sig Last Dose Taking? Auth Provider Long Term End Date   acetaminophen (TYLENOL) 500 MG tablet Take 500-1,000 mg by mouth every 6 hours as needed for mild pain. PRN Yes Reported, Patient     atorvastatin (LIPITOR) 20 MG tablet Take 20 mg by mouth At Bedtime 09/22/2024 at PM Yes Reported, Patient Yes    clindamycin (CLEOCIN) 300 MG capsule Take 600 mg by mouth daily as needed (2 X 300 mg) 1 hour prior to dental appointments. PRN Yes Reported, Patient     cyanocobalamin (VITAMIN B-12) 1000 MCG tablet Take 1,000 mcg by mouth daily 09/22/2024 at PM Yes Reported, Patient     fluPHENAZine (PROLIXIN) 1 MG tablet Take 1 mg by mouth once a week. FRIDAYS 09/20/2024 at AM Yes Reported, Patient No    latanoprost (XALATAN) 0.005 % ophthalmic solution Place 1 drop into both eyes At Bedtime 09/22/2024 at PM Yes Reported, Patient Yes    levothyroxine (SYNTHROID/LEVOTHROID) 88 MCG tablet Take 88 mcg by mouth daily Use 2 extra doses weekly as directed. 09/22/2024 at AM Yes Reported, Patient Yes    losartan (COZAAR) 100 MG tablet Take 100 mg by mouth every morning 09/22/2024 at AM Yes Reported, Patient  Yes    metoprolol succinate ER (TOPROL XL) 50 MG 24 hr tablet Take 25 mg by mouth every morning (0.5 X 50 mg) 09/23/2024 at AM Yes Reported, Patient Yes    multiple vitamin  tablet TABS Take 1 tablet by mouth daily. 09/09/2024 at PM Yes Reported, Patient     spironolactone (ALDACTONE) 25 MG tablet Take 25 mg by mouth daily. 09/22/2024 at AM Yes Reported, Patient Yes    Vitamin D3 (CHOLECALCIFEROL) 25 mcg (1000 units) tablet Take 1 tablet by mouth daily 09/22/2024 at PM Yes Reported, Patient     amLODIPine (NORVASC) 5 MG tablet Take 5 mg by mouth every morning 09/22/2024 at AM  Reported, Patient Yes        Medication history completed by: Agnes Bowles

## 2024-09-22 ENCOUNTER — ANESTHESIA EVENT (OUTPATIENT)
Dept: SURGERY | Facility: CLINIC | Age: 84
DRG: 735 | End: 2024-09-22
Payer: MEDICARE

## 2024-09-23 ENCOUNTER — ANESTHESIA (OUTPATIENT)
Dept: SURGERY | Facility: CLINIC | Age: 84
DRG: 735 | End: 2024-09-23
Payer: MEDICARE

## 2024-09-23 ENCOUNTER — HOSPITAL ENCOUNTER (INPATIENT)
Facility: CLINIC | Age: 84
LOS: 1 days | Discharge: HOME HEALTH | DRG: 735 | End: 2024-09-24
Attending: OBSTETRICS & GYNECOLOGY | Admitting: OBSTETRICS & GYNECOLOGY
Payer: MEDICARE

## 2024-09-23 DIAGNOSIS — C54.1 ENDOMETRIAL CANCER (H): Primary | ICD-10-CM

## 2024-09-23 LAB
ABO/RH(D): NORMAL
ANTIBODY SCREEN: NEGATIVE
HGB BLD-MCNC: 13.6 G/DL (ref 11.7–15.7)
POTASSIUM SERPL-SCNC: 3.9 MMOL/L (ref 3.4–5.3)
SPECIMEN EXPIRATION DATE: NORMAL

## 2024-09-23 PROCEDURE — 0DBW4ZX EXCISION OF PERITONEUM, PERCUTANEOUS ENDOSCOPIC APPROACH, DIAGNOSTIC: ICD-10-PCS | Performed by: OBSTETRICS & GYNECOLOGY

## 2024-09-23 PROCEDURE — 07TD4ZZ RESECTION OF AORTIC LYMPHATIC, PERCUTANEOUS ENDOSCOPIC APPROACH: ICD-10-PCS | Performed by: OBSTETRICS & GYNECOLOGY

## 2024-09-23 PROCEDURE — 250N000009 HC RX 250: Performed by: OBSTETRICS & GYNECOLOGY

## 2024-09-23 PROCEDURE — 0DBU4ZX EXCISION OF OMENTUM, PERCUTANEOUS ENDOSCOPIC APPROACH, DIAGNOSTIC: ICD-10-PCS | Performed by: OBSTETRICS & GYNECOLOGY

## 2024-09-23 PROCEDURE — 250N000009 HC RX 250: Performed by: ANESTHESIOLOGY

## 2024-09-23 PROCEDURE — 86901 BLOOD TYPING SEROLOGIC RH(D): CPT | Performed by: PHYSICIAN ASSISTANT

## 2024-09-23 PROCEDURE — 250N000011 HC RX IP 250 OP 636: Performed by: ANESTHESIOLOGY

## 2024-09-23 PROCEDURE — 0UT94ZZ RESECTION OF UTERUS, PERCUTANEOUS ENDOSCOPIC APPROACH: ICD-10-PCS | Performed by: OBSTETRICS & GYNECOLOGY

## 2024-09-23 PROCEDURE — 999N000141 HC STATISTIC PRE-PROCEDURE NURSING ASSESSMENT: Performed by: OBSTETRICS & GYNECOLOGY

## 2024-09-23 PROCEDURE — 120N000001 HC R&B MED SURG/OB

## 2024-09-23 PROCEDURE — 58571 TLH W/T/O 250 G OR LESS: CPT | Performed by: NURSE ANESTHETIST, CERTIFIED REGISTERED

## 2024-09-23 PROCEDURE — 8E0W4CZ ROBOTIC ASSISTED PROCEDURE OF TRUNK REGION, PERCUTANEOUS ENDOSCOPIC APPROACH: ICD-10-PCS | Performed by: OBSTETRICS & GYNECOLOGY

## 2024-09-23 PROCEDURE — 0UT74ZZ RESECTION OF BILATERAL FALLOPIAN TUBES, PERCUTANEOUS ENDOSCOPIC APPROACH: ICD-10-PCS | Performed by: OBSTETRICS & GYNECOLOGY

## 2024-09-23 PROCEDURE — 250N000009 HC RX 250: Performed by: NURSE ANESTHETIST, CERTIFIED REGISTERED

## 2024-09-23 PROCEDURE — 258N000003 HC RX IP 258 OP 636

## 2024-09-23 PROCEDURE — 4A1685H MONITORING OF LYMPHATIC FLOW USING INDOCYANINE GREEN DYE, VIA NATURAL OR ARTIFICIAL OPENING ENDOSCOPIC: ICD-10-PCS | Performed by: OBSTETRICS & GYNECOLOGY

## 2024-09-23 PROCEDURE — 88305 TISSUE EXAM BY PATHOLOGIST: CPT | Mod: TC | Performed by: OBSTETRICS & GYNECOLOGY

## 2024-09-23 PROCEDURE — 250N000011 HC RX IP 250 OP 636: Performed by: OBSTETRICS & GYNECOLOGY

## 2024-09-23 PROCEDURE — 36415 COLL VENOUS BLD VENIPUNCTURE: CPT | Performed by: ANESTHESIOLOGY

## 2024-09-23 PROCEDURE — 258N000003 HC RX IP 258 OP 636: Performed by: NURSE ANESTHETIST, CERTIFIED REGISTERED

## 2024-09-23 PROCEDURE — 86900 BLOOD TYPING SEROLOGIC ABO: CPT | Performed by: PHYSICIAN ASSISTANT

## 2024-09-23 PROCEDURE — 272N000001 HC OR GENERAL SUPPLY STERILE: Performed by: OBSTETRICS & GYNECOLOGY

## 2024-09-23 PROCEDURE — 85018 HEMOGLOBIN: CPT | Performed by: PHYSICIAN ASSISTANT

## 2024-09-23 PROCEDURE — 250N000025 HC SEVOFLURANE, PER MIN: Performed by: OBSTETRICS & GYNECOLOGY

## 2024-09-23 PROCEDURE — 07TC4ZZ RESECTION OF PELVIS LYMPHATIC, PERCUTANEOUS ENDOSCOPIC APPROACH: ICD-10-PCS | Performed by: OBSTETRICS & GYNECOLOGY

## 2024-09-23 PROCEDURE — 250N000013 HC RX MED GY IP 250 OP 250 PS 637: Performed by: PHYSICIAN ASSISTANT

## 2024-09-23 PROCEDURE — 250N000011 HC RX IP 250 OP 636: Performed by: PHYSICIAN ASSISTANT

## 2024-09-23 PROCEDURE — 360N000080 HC SURGERY LEVEL 7, PER MIN: Performed by: OBSTETRICS & GYNECOLOGY

## 2024-09-23 PROCEDURE — C1765 ADHESION BARRIER: HCPCS | Performed by: OBSTETRICS & GYNECOLOGY

## 2024-09-23 PROCEDURE — 0UT24ZZ RESECTION OF BILATERAL OVARIES, PERCUTANEOUS ENDOSCOPIC APPROACH: ICD-10-PCS | Performed by: OBSTETRICS & GYNECOLOGY

## 2024-09-23 PROCEDURE — 370N000017 HC ANESTHESIA TECHNICAL FEE, PER MIN: Performed by: OBSTETRICS & GYNECOLOGY

## 2024-09-23 PROCEDURE — 84132 ASSAY OF SERUM POTASSIUM: CPT | Performed by: ANESTHESIOLOGY

## 2024-09-23 PROCEDURE — 258N000001 HC RX 258: Performed by: OBSTETRICS & GYNECOLOGY

## 2024-09-23 PROCEDURE — 250N000011 HC RX IP 250 OP 636: Performed by: NURSE ANESTHETIST, CERTIFIED REGISTERED

## 2024-09-23 PROCEDURE — 258N000003 HC RX IP 258 OP 636: Performed by: PHYSICIAN ASSISTANT

## 2024-09-23 PROCEDURE — 99100 ANES PT EXTEME AGE<1 YR&>70: CPT | Performed by: NURSE ANESTHETIST, CERTIFIED REGISTERED

## 2024-09-23 PROCEDURE — 710N000009 HC RECOVERY PHASE 1, LEVEL 1, PER MIN: Performed by: OBSTETRICS & GYNECOLOGY

## 2024-09-23 PROCEDURE — 58571 TLH W/T/O 250 G OR LESS: CPT | Performed by: ANESTHESIOLOGY

## 2024-09-23 RX ORDER — CEFAZOLIN SODIUM/WATER 2 G/20 ML
2 SYRINGE (ML) INTRAVENOUS
Status: COMPLETED | OUTPATIENT
Start: 2024-09-23 | End: 2024-09-23

## 2024-09-23 RX ORDER — SODIUM CHLORIDE 9 MG/ML
INJECTION, SOLUTION INTRAVENOUS CONTINUOUS
Status: DISCONTINUED | OUTPATIENT
Start: 2024-09-23 | End: 2024-09-23 | Stop reason: ALTCHOICE

## 2024-09-23 RX ORDER — NALOXONE HYDROCHLORIDE 0.4 MG/ML
0.4 INJECTION, SOLUTION INTRAMUSCULAR; INTRAVENOUS; SUBCUTANEOUS
Status: DISCONTINUED | OUTPATIENT
Start: 2024-09-23 | End: 2024-09-24 | Stop reason: HOSPADM

## 2024-09-23 RX ORDER — METOPROLOL SUCCINATE 25 MG/1
25 TABLET, EXTENDED RELEASE ORAL EVERY MORNING
Status: DISCONTINUED | OUTPATIENT
Start: 2024-09-24 | End: 2024-09-24 | Stop reason: HOSPADM

## 2024-09-23 RX ORDER — ACETAMINOPHEN 325 MG/1
975 TABLET ORAL ONCE
Status: COMPLETED | OUTPATIENT
Start: 2024-09-23 | End: 2024-09-23

## 2024-09-23 RX ORDER — KETOROLAC TROMETHAMINE 15 MG/ML
15 INJECTION, SOLUTION INTRAMUSCULAR; INTRAVENOUS ONCE
Status: COMPLETED | OUTPATIENT
Start: 2024-09-23 | End: 2024-09-23

## 2024-09-23 RX ORDER — HYDROMORPHONE HYDROCHLORIDE 2 MG/1
2 TABLET ORAL EVERY 4 HOURS PRN
Status: DISCONTINUED | OUTPATIENT
Start: 2024-09-23 | End: 2024-09-24 | Stop reason: HOSPADM

## 2024-09-23 RX ORDER — EPHEDRINE SULFATE 50 MG/ML
INJECTION, SOLUTION INTRAMUSCULAR; INTRAVENOUS; SUBCUTANEOUS PRN
Status: DISCONTINUED | OUTPATIENT
Start: 2024-09-23 | End: 2024-09-23

## 2024-09-23 RX ORDER — DEXAMETHASONE SODIUM PHOSPHATE 4 MG/ML
INJECTION, SOLUTION INTRA-ARTICULAR; INTRALESIONAL; INTRAMUSCULAR; INTRAVENOUS; SOFT TISSUE PRN
Status: DISCONTINUED | OUTPATIENT
Start: 2024-09-23 | End: 2024-09-23

## 2024-09-23 RX ORDER — SPIRONOLACTONE 25 MG/1
25 TABLET ORAL DAILY
Status: DISCONTINUED | OUTPATIENT
Start: 2024-09-24 | End: 2024-09-24 | Stop reason: HOSPADM

## 2024-09-23 RX ORDER — HEPARIN SODIUM 5000 [USP'U]/.5ML
5000 INJECTION, SOLUTION INTRAVENOUS; SUBCUTANEOUS
Status: COMPLETED | OUTPATIENT
Start: 2024-09-23 | End: 2024-09-23

## 2024-09-23 RX ORDER — SODIUM CHLORIDE, SODIUM LACTATE, POTASSIUM CHLORIDE, CALCIUM CHLORIDE 600; 310; 30; 20 MG/100ML; MG/100ML; MG/100ML; MG/100ML
INJECTION, SOLUTION INTRAVENOUS CONTINUOUS PRN
Status: DISCONTINUED | OUTPATIENT
Start: 2024-09-23 | End: 2024-09-23

## 2024-09-23 RX ORDER — NALOXONE HYDROCHLORIDE 0.4 MG/ML
0.2 INJECTION, SOLUTION INTRAMUSCULAR; INTRAVENOUS; SUBCUTANEOUS
Status: DISCONTINUED | OUTPATIENT
Start: 2024-09-23 | End: 2024-09-24 | Stop reason: HOSPADM

## 2024-09-23 RX ORDER — FENTANYL CITRATE 50 UG/ML
INJECTION, SOLUTION INTRAMUSCULAR; INTRAVENOUS PRN
Status: DISCONTINUED | OUTPATIENT
Start: 2024-09-23 | End: 2024-09-23

## 2024-09-23 RX ORDER — CEFAZOLIN SODIUM/WATER 2 G/20 ML
2 SYRINGE (ML) INTRAVENOUS SEE ADMIN INSTRUCTIONS
Status: DISCONTINUED | OUTPATIENT
Start: 2024-09-23 | End: 2024-09-23 | Stop reason: HOSPADM

## 2024-09-23 RX ORDER — HYDROMORPHONE HCL IN WATER/PF 6 MG/30 ML
0.4 PATIENT CONTROLLED ANALGESIA SYRINGE INTRAVENOUS
Status: DISCONTINUED | OUTPATIENT
Start: 2024-09-23 | End: 2024-09-24 | Stop reason: HOSPADM

## 2024-09-23 RX ORDER — HYDROMORPHONE HCL IN WATER/PF 6 MG/30 ML
0.2 PATIENT CONTROLLED ANALGESIA SYRINGE INTRAVENOUS EVERY 5 MIN PRN
Status: DISCONTINUED | OUTPATIENT
Start: 2024-09-23 | End: 2024-09-23 | Stop reason: HOSPADM

## 2024-09-23 RX ORDER — INDOCYANINE GREEN AND WATER 25 MG
KIT INJECTION PRN
Status: DISCONTINUED | OUTPATIENT
Start: 2024-09-23 | End: 2024-09-23 | Stop reason: HOSPADM

## 2024-09-23 RX ORDER — MAGNESIUM HYDROXIDE 1200 MG/15ML
LIQUID ORAL PRN
Status: DISCONTINUED | OUTPATIENT
Start: 2024-09-23 | End: 2024-09-23 | Stop reason: HOSPADM

## 2024-09-23 RX ORDER — SODIUM CHLORIDE 9 MG/ML
INJECTION, SOLUTION INTRAVENOUS CONTINUOUS
Status: DISCONTINUED | OUTPATIENT
Start: 2024-09-23 | End: 2024-09-24

## 2024-09-23 RX ORDER — ACETAMINOPHEN 325 MG/1
650 TABLET ORAL EVERY 6 HOURS PRN
Status: DISCONTINUED | OUTPATIENT
Start: 2024-09-23 | End: 2024-09-24 | Stop reason: HOSPADM

## 2024-09-23 RX ORDER — LOSARTAN POTASSIUM 100 MG/1
100 TABLET ORAL EVERY MORNING
Status: DISCONTINUED | OUTPATIENT
Start: 2024-09-24 | End: 2024-09-24 | Stop reason: HOSPADM

## 2024-09-23 RX ORDER — HYDROMORPHONE HCL IN WATER/PF 6 MG/30 ML
0.2 PATIENT CONTROLLED ANALGESIA SYRINGE INTRAVENOUS
Status: DISCONTINUED | OUTPATIENT
Start: 2024-09-23 | End: 2024-09-24 | Stop reason: HOSPADM

## 2024-09-23 RX ORDER — LIDOCAINE HYDROCHLORIDE 20 MG/ML
INJECTION, SOLUTION INFILTRATION; PERINEURAL PRN
Status: DISCONTINUED | OUTPATIENT
Start: 2024-09-23 | End: 2024-09-23

## 2024-09-23 RX ORDER — PROPOFOL 10 MG/ML
INJECTION, EMULSION INTRAVENOUS CONTINUOUS PRN
Status: DISCONTINUED | OUTPATIENT
Start: 2024-09-23 | End: 2024-09-23

## 2024-09-23 RX ORDER — ONDANSETRON 2 MG/ML
4 INJECTION INTRAMUSCULAR; INTRAVENOUS EVERY 6 HOURS PRN
Status: DISCONTINUED | OUTPATIENT
Start: 2024-09-23 | End: 2024-09-24 | Stop reason: HOSPADM

## 2024-09-23 RX ORDER — HYDROMORPHONE HYDROCHLORIDE 2 MG/1
4 TABLET ORAL EVERY 4 HOURS PRN
Status: DISCONTINUED | OUTPATIENT
Start: 2024-09-23 | End: 2024-09-24 | Stop reason: HOSPADM

## 2024-09-23 RX ORDER — AMLODIPINE BESYLATE 5 MG/1
5 TABLET ORAL EVERY MORNING
Status: DISCONTINUED | OUTPATIENT
Start: 2024-09-24 | End: 2024-09-24 | Stop reason: HOSPADM

## 2024-09-23 RX ORDER — ONDANSETRON 2 MG/ML
4 INJECTION INTRAMUSCULAR; INTRAVENOUS EVERY 30 MIN PRN
Status: DISCONTINUED | OUTPATIENT
Start: 2024-09-23 | End: 2024-09-23 | Stop reason: HOSPADM

## 2024-09-23 RX ORDER — ONDANSETRON 4 MG/1
4 TABLET, ORALLY DISINTEGRATING ORAL EVERY 6 HOURS PRN
Status: DISCONTINUED | OUTPATIENT
Start: 2024-09-23 | End: 2024-09-24 | Stop reason: HOSPADM

## 2024-09-23 RX ORDER — SODIUM CHLORIDE, SODIUM LACTATE, POTASSIUM CHLORIDE, CALCIUM CHLORIDE 600; 310; 30; 20 MG/100ML; MG/100ML; MG/100ML; MG/100ML
INJECTION, SOLUTION INTRAVENOUS CONTINUOUS
Status: DISCONTINUED | OUTPATIENT
Start: 2024-09-23 | End: 2024-09-23 | Stop reason: HOSPADM

## 2024-09-23 RX ORDER — NALOXONE HYDROCHLORIDE 0.4 MG/ML
0.1 INJECTION, SOLUTION INTRAMUSCULAR; INTRAVENOUS; SUBCUTANEOUS
Status: DISCONTINUED | OUTPATIENT
Start: 2024-09-23 | End: 2024-09-23 | Stop reason: HOSPADM

## 2024-09-23 RX ORDER — BUPIVACAINE HYDROCHLORIDE 5 MG/ML
INJECTION, SOLUTION EPIDURAL; INTRACAUDAL PRN
Status: DISCONTINUED | OUTPATIENT
Start: 2024-09-23 | End: 2024-09-23 | Stop reason: HOSPADM

## 2024-09-23 RX ORDER — METRONIDAZOLE 500 MG/100ML
500 INJECTION, SOLUTION INTRAVENOUS
Status: COMPLETED | OUTPATIENT
Start: 2024-09-23 | End: 2024-09-23

## 2024-09-23 RX ORDER — FENTANYL CITRATE 0.05 MG/ML
25 INJECTION, SOLUTION INTRAMUSCULAR; INTRAVENOUS EVERY 5 MIN PRN
Status: DISCONTINUED | OUTPATIENT
Start: 2024-09-23 | End: 2024-09-23 | Stop reason: HOSPADM

## 2024-09-23 RX ORDER — LEVOTHYROXINE SODIUM 88 UG/1
88 TABLET ORAL DAILY
Status: DISCONTINUED | OUTPATIENT
Start: 2024-09-24 | End: 2024-09-24 | Stop reason: HOSPADM

## 2024-09-23 RX ORDER — CALCIUM CARBONATE 500 MG/1
500 TABLET, CHEWABLE ORAL 4 TIMES DAILY PRN
Status: DISCONTINUED | OUTPATIENT
Start: 2024-09-23 | End: 2024-09-24 | Stop reason: HOSPADM

## 2024-09-23 RX ORDER — HYDRALAZINE HYDROCHLORIDE 20 MG/ML
2.5-5 INJECTION INTRAMUSCULAR; INTRAVENOUS EVERY 10 MIN PRN
Status: DISCONTINUED | OUTPATIENT
Start: 2024-09-23 | End: 2024-09-23 | Stop reason: HOSPADM

## 2024-09-23 RX ORDER — LABETALOL HYDROCHLORIDE 5 MG/ML
10 INJECTION, SOLUTION INTRAVENOUS
Status: DISCONTINUED | OUTPATIENT
Start: 2024-09-23 | End: 2024-09-23 | Stop reason: HOSPADM

## 2024-09-23 RX ORDER — ONDANSETRON 4 MG/1
4 TABLET, ORALLY DISINTEGRATING ORAL EVERY 30 MIN PRN
Status: DISCONTINUED | OUTPATIENT
Start: 2024-09-23 | End: 2024-09-23 | Stop reason: HOSPADM

## 2024-09-23 RX ORDER — ONDANSETRON 2 MG/ML
INJECTION INTRAMUSCULAR; INTRAVENOUS PRN
Status: DISCONTINUED | OUTPATIENT
Start: 2024-09-23 | End: 2024-09-23

## 2024-09-23 RX ORDER — LATANOPROST 50 UG/ML
1 SOLUTION/ DROPS OPHTHALMIC AT BEDTIME
Status: DISCONTINUED | OUTPATIENT
Start: 2024-09-23 | End: 2024-09-24 | Stop reason: HOSPADM

## 2024-09-23 RX ORDER — ATORVASTATIN CALCIUM 20 MG/1
20 TABLET, FILM COATED ORAL AT BEDTIME
Status: DISCONTINUED | OUTPATIENT
Start: 2024-09-23 | End: 2024-09-24 | Stop reason: HOSPADM

## 2024-09-23 RX ORDER — DEXAMETHASONE SODIUM PHOSPHATE 4 MG/ML
4 INJECTION, SOLUTION INTRA-ARTICULAR; INTRALESIONAL; INTRAMUSCULAR; INTRAVENOUS; SOFT TISSUE
Status: DISCONTINUED | OUTPATIENT
Start: 2024-09-23 | End: 2024-09-23 | Stop reason: HOSPADM

## 2024-09-23 RX ORDER — PROPOFOL 10 MG/ML
INJECTION, EMULSION INTRAVENOUS PRN
Status: DISCONTINUED | OUTPATIENT
Start: 2024-09-23 | End: 2024-09-23

## 2024-09-23 RX ORDER — LIDOCAINE 40 MG/G
CREAM TOPICAL
Status: DISCONTINUED | OUTPATIENT
Start: 2024-09-23 | End: 2024-09-24 | Stop reason: HOSPADM

## 2024-09-23 RX ADMIN — ROCURONIUM BROMIDE 20 MG: 50 INJECTION, SOLUTION INTRAVENOUS at 13:00

## 2024-09-23 RX ADMIN — ONDANSETRON 4 MG: 2 INJECTION INTRAMUSCULAR; INTRAVENOUS at 14:47

## 2024-09-23 RX ADMIN — DEXAMETHASONE SODIUM PHOSPHATE 4 MG: 4 INJECTION, SOLUTION INTRA-ARTICULAR; INTRALESIONAL; INTRAMUSCULAR; INTRAVENOUS; SOFT TISSUE at 12:49

## 2024-09-23 RX ADMIN — SODIUM CHLORIDE: 9 INJECTION, SOLUTION INTRAVENOUS at 18:33

## 2024-09-23 RX ADMIN — METRONIDAZOLE 500 MG: 500 INJECTION, SOLUTION INTRAVENOUS at 11:33

## 2024-09-23 RX ADMIN — FENTANYL CITRATE 25 MCG: 50 INJECTION INTRAMUSCULAR; INTRAVENOUS at 12:49

## 2024-09-23 RX ADMIN — LIDOCAINE HYDROCHLORIDE 30 MG: 20 INJECTION, SOLUTION INFILTRATION; PERINEURAL at 12:49

## 2024-09-23 RX ADMIN — KETOROLAC TROMETHAMINE 15 MG: 15 INJECTION, SOLUTION INTRAMUSCULAR; INTRAVENOUS at 15:47

## 2024-09-23 RX ADMIN — HYDROMORPHONE HYDROCHLORIDE 0.5 MG: 1 INJECTION, SOLUTION INTRAMUSCULAR; INTRAVENOUS; SUBCUTANEOUS at 13:59

## 2024-09-23 RX ADMIN — Medication 5 MG: at 13:36

## 2024-09-23 RX ADMIN — LATANOPROST 1 DROP: 50 SOLUTION OPHTHALMIC at 22:06

## 2024-09-23 RX ADMIN — PROPOFOL 130 MG: 10 INJECTION, EMULSION INTRAVENOUS at 12:49

## 2024-09-23 RX ADMIN — ATORVASTATIN CALCIUM 20 MG: 20 TABLET, FILM COATED ORAL at 22:06

## 2024-09-23 RX ADMIN — SODIUM CHLORIDE, POTASSIUM CHLORIDE, SODIUM LACTATE AND CALCIUM CHLORIDE: 600; 310; 30; 20 INJECTION, SOLUTION INTRAVENOUS at 12:41

## 2024-09-23 RX ADMIN — Medication 200 MG: at 15:15

## 2024-09-23 RX ADMIN — SODIUM CHLORIDE, POTASSIUM CHLORIDE, SODIUM LACTATE AND CALCIUM CHLORIDE: 600; 310; 30; 20 INJECTION, SOLUTION INTRAVENOUS at 14:48

## 2024-09-23 RX ADMIN — ROCURONIUM BROMIDE 30 MG: 50 INJECTION, SOLUTION INTRAVENOUS at 12:49

## 2024-09-23 RX ADMIN — ROCURONIUM BROMIDE 20 MG: 50 INJECTION, SOLUTION INTRAVENOUS at 14:16

## 2024-09-23 RX ADMIN — ACETAMINOPHEN 650 MG: 325 TABLET ORAL at 22:24

## 2024-09-23 RX ADMIN — ROCURONIUM BROMIDE 20 MG: 50 INJECTION, SOLUTION INTRAVENOUS at 13:47

## 2024-09-23 RX ADMIN — HEPARIN SODIUM 5000 UNITS: 5000 INJECTION, SOLUTION INTRAVENOUS; SUBCUTANEOUS at 10:06

## 2024-09-23 RX ADMIN — FENTANYL CITRATE 25 MCG: 50 INJECTION INTRAMUSCULAR; INTRAVENOUS at 16:17

## 2024-09-23 RX ADMIN — ACETAMINOPHEN 975 MG: 325 TABLET ORAL at 10:10

## 2024-09-23 RX ADMIN — FENTANYL CITRATE 25 MCG: 50 INJECTION INTRAMUSCULAR; INTRAVENOUS at 15:45

## 2024-09-23 RX ADMIN — Medication 2 G: at 12:47

## 2024-09-23 RX ADMIN — PROPOFOL 30 MCG/KG/MIN: 10 INJECTION, EMULSION INTRAVENOUS at 12:57

## 2024-09-23 RX ADMIN — FENTANYL CITRATE 50 MCG: 50 INJECTION INTRAMUSCULAR; INTRAVENOUS at 13:19

## 2024-09-23 RX ADMIN — FENTANYL CITRATE 25 MCG: 50 INJECTION INTRAMUSCULAR; INTRAVENOUS at 16:00

## 2024-09-23 RX ADMIN — FENTANYL CITRATE 25 MCG: 50 INJECTION INTRAMUSCULAR; INTRAVENOUS at 16:24

## 2024-09-23 RX ADMIN — FENTANYL CITRATE 25 MCG: 50 INJECTION INTRAMUSCULAR; INTRAVENOUS at 12:59

## 2024-09-23 RX ADMIN — PHENYLEPHRINE HYDROCHLORIDE 100 MCG: 10 INJECTION INTRAVENOUS at 13:26

## 2024-09-23 ASSESSMENT — ACTIVITIES OF DAILY LIVING (ADL)
ADLS_ACUITY_SCORE: 27
ADLS_ACUITY_SCORE: 33
ADLS_ACUITY_SCORE: 27
ADLS_ACUITY_SCORE: 18
ADLS_ACUITY_SCORE: 23
ADLS_ACUITY_SCORE: 18
ADLS_ACUITY_SCORE: 18
ADLS_ACUITY_SCORE: 27
ADLS_ACUITY_SCORE: 18
ADLS_ACUITY_SCORE: 26

## 2024-09-23 ASSESSMENT — ENCOUNTER SYMPTOMS
DYSRHYTHMIAS: 0
SEIZURES: 0

## 2024-09-23 ASSESSMENT — LIFESTYLE VARIABLES: TOBACCO_USE: 1

## 2024-09-23 NOTE — OP NOTE
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 Pelvic ultrasound on 2024 noted an anteverted uterus that measured 9.8 x 4.7 x 5.4 cm with 22 mm endometrial lining. CT chest/abd/pelvis w/ IV contrast on 2024 noted large uterine mass centered in the endometrium consistent with known endometrial cancer with the mass appearing to invade the uterine myometrium without adjacent organ invasion. No definite evidence for metastatic disease in the chest, abdomen and pelvis. She desired to proceed with upfront surgery.    FINDINGS: On pelvic examination under anesthesia, there were no lesions along bilateral vulva. There was excellent descensus of cervix. The cervix was grossly normal. The vaginal mucosa was without any gross abnormalities and had moderate atrophy. The uterus sounded to 9 cm.    On laparoscopy, there was no free fluid. There was good intraoperative sentinel lymph node mapping, bilaterally. The left pelvis had ICG mapping to distal external iliac artery. The right pelvic had ICG mapping to mid-common iliac artery. There was ICG mapping of right para-aortic lymph node ~1 cm above the pelvic brim, that was ~2 x 2 cm in  conglomerate size. This lymph node appeared to be enlarged, but, also difficult to grossly tell if >1 lymph node or bundle of nodes. The uterus was moderately enlarged. There was a right lower uterine segment mass ~2 x 2 that was covered by serosa. There were no obvious masses within the serosal layer, at least, grossly. Bilateral adnexa were without any obvious abnormalities. Bilateral ureters were noted to be vermiculating and without evidence of hydronephrosis. The right pelvic peritoneum contained some small white, 1 mm implants that were biopsied. The remainder of the peritoneal surfaces were smooth, including bilateral hemidiaphragms. The liver edge, stomach and omentum were all grossly normal. The appendix was not visualized. The small bowel appeared grossly normal. There was diverticular disease along the colon, but, otherwise without any obvious abnormalities.     Interceed x 1 was placed over the right sentinel para-aortic lymph node dissection bed. Surgicell x 2 was placed over the remainder of the surgical sites. There was excellent hemostasis at the conclusion of the procedure.    SPECIMENS:   ID Type Source Tests Collected by Time Destination   1 : PELVIC WASHINGS Washings Pelvis NON-GYNECOLOGIC CYTOLOGY Nikki Hernandez MD 9/23/2024  1:39 PM    2 : RIGHT SENTINEL PELVIC LYMPH NODE Tissue Lymph Node(s), [place] SURGICAL PATHOLOGY EXAM Nikki Hernandez MD 9/23/2024  2:00 PM    3 : RIGHT PARA-AORTIC SENTINEL LYMPH NODE Tissue Lymph Node(s), Para-Aortic SURGICAL PATHOLOGY EXAM Nikki Hernandez MD 9/23/2024  2:09 PM    4 : LEFT SENTINEL PELVIC LYMPH NODE Tissue Lymph Node(s), [place] SURGICAL PATHOLOGY EXAM Nikki Hernandez MD 9/23/2024  2:26 PM    5 : OMENTAL BIOPSY Biopsy Omentum SURGICAL PATHOLOGY EXAM Nikki Hernandez MD 9/23/2024  2:42 PM    6 : UTERUS, CERVIX, BILATERAL FALLOPIAN TUBES, BILATERAL OVARIES Tissue Uterus, Cervix, Bilateral Fallopian Tubes & Ovaries SURGICAL PATHOLOGY  EXAM Nikki Hernandez MD 9/23/2024  2:42 PM    7 : RIGHT PELVIC PERITONEUM BIOPSY Tissue Peritoneum SURGICAL PATHOLOGY EXAM Nikki Hernandez MD 9/23/2024  3:02 PM      COMPLICATIONS: None.    CONDITION: Stable to PACU.    OPERATIVE PROCEDURE IN DETAIL: Consent was reviewed with the patient in the preoperative setting and confirmed. She received prophylactic antibiotics with IV Cefazolin 2 grams and IV Metronidazole 500 milligrams. In addition, she received prophylactic SQ Heparin 5,000 units and bilateral sequential compression devices for venous thromboembolism prevention. A surgical debriefing was performed with the operating room team prior to patient entry into the operating room. The patient was transferred to the operating room and placed in dorsal supine position. General anesthetic was obtained in the usual manner without noted difficulties. The patient was then positioned onto yellowfin stirrups. The patient was prepped and draped for the above-mentioned procedure.    Timeout was called at which point the patient's name, procedure and operative site was confirmed by the operative team. The umbilicus was elevated and the Veress needle introduced through the base of the umbilicus. The abdomen was insufflated with an opening pressure of 3 mmHg. The Veress needle was removed. Sites for the da Kelton XI laparoscopic ports were demarcated, total of 5, initiating' midline approximately 3 cm above the umbilicus and positioned in a straight line around the upper abdomen. The initial midline 8 mm port was inserted without any issues. The remaining 4 ports were placed under direct visualization. Left lateral ports were placed with 8 mm da Kelton XI robotic ports x 2. Additional 8 mm da Kelton XI robotic port was placed along medial right side x 1. The right lateral most port was 8 mm AirSeal port. CO2 insufflation was switched over to AirSeal mode. At this point, the patient was placed into steep Trendelenburg. The  "pelvis was inspected as well as the upper abdomen as noted above. Bowels were packed up into the upper abdomen with gentle traction. Next, the Palacios catheter under sterile technique. A speculum was placed in the vagina and instruments for the uterine manipulator were positioned. The anterior lip of the cervix was grasped. Next, the uterus sounded to 9 cm, and cervix was serially dilated. Indocyanine green (ICG) dye was injected superficially and deep at 3 o'clock and 9 o'clock. Approximately 1 cc was used x 4. The medium VCare uterine manipulator was inserted and the cervical cup affixed without any difficulty. The da Kelton XI was docked onto the ports, and all appropriate instruments were inserted. Pelvic washings were collected and sent to surgical cytology for routine analysis.     The procedure was initiated by performing bilateral sentinel lymph node mapping. The da Kelton XI firefly mode was utilized to denote the ICG uptake along lymphatic channels. A sentinel node was mapped bilaterally with further details above. We isolated the right round ligament, which was cauterized and transected using synchroseal device. The posterior leaf of the broad ligament was dissected. The right ureter was identified and noted to be vermiculating. A peritoneal window was made below the right infundibulopelvic ligament, well above the right ureter. The right IP ligament was multiply sealed and transected using the synchroseal device. The posterior peritoneum was dissected to the level of the posterior uterus with the right ureter in view at all times. The right ureter was retracted medially from the sentinel lymph node. The right external iliac artery and right external iliac vein were both identified. The right obturator nerve was identified. The node was carefully excised from these critical structures and removed via assistant port. This was labeled as \"right sentinel pelvic lymph node\" and sent to surgical pathology for " "routine analysis. The same process was repeated on the left side with respective labeling of \"left sentinel pelvic lymph node\" and also sent for routine analysis. Some filmy adhesions of the sigmoid colon were taken down along the line of Toldt on the left side.     There was ICG mapping to right para-aortic lymph node. The peritoneum above the aorta was dissected. The IVC was identified. The right ureter was in view at all times. The lymph node was carefully dissected from this area and removed via assistant port. \"Right sentinel para-aortic lymph node\" was sent to surgical pathology for routine analysis.    A portion of the infracolic omentum was grasped and brought down to the pelvis. The synchroseal device was used to seal and transect a 4 x 4 cm \"omental biopsy\" and sent to surgical pathology for routine analysis. The specimen was removed transvaginally, after the hysterectomy was completed, due to the size of the specimen.    At this point, we initiated the hysterectomy by incising the vesicouterine peritoneum. The bladder flap was developed well at the upper vagina. The uterus had adequate mobility, bilateral uterine arteries could be isolated and these were cauterized with the synchroseal device and transected. Initially on the right, we extended along the cardinal and uterosacral ligaments, staying close to the cervix to the level of the upper vagina. This was cauterized and transected using the synchroseal device. Similarly, we isolated out the left cardinal ligament and uterosacral ligament which were cauterized and transected. At this point, we were able to palpate the line of the medium VCare cup at the level of the upper vagina in a circumferential manner. We incised along the upper vagina to resect the cervix and uterus. The specimen was brought out through the vaginal opening and labeled \"uterus, cervix, bilateral fallopian tubes and ovaries\" and sent to surgical pathology for routine analysis.    A " "wet laparotomy sponge was inserted to obtain adequate insufflation. The vaginal cuff had been well-developed and clearly the bladder was out of the way. The vaginal cuff was closed with running V-lock suture with excellent re-approximation.     \"Right pelvic peritoneum\" was biopsied with the right ureter in direct view at all times. This was ~2 x 4 cm in size and removed via assistant port.    The pelvis was inspected and copiously irrigated. Interceed x 1 was placed over the right para-aortic lymph node dissection site. Surgicell x 2 was placed over the remainder of the surgical dissection sites. There was excellent hemostasis at the conclusion of the procedure.    All laparoscopic instruments and ports were now removed and CO2 allowed to escape from the abdomen. The da Kelton XI robot was undocked without incident. Skin was reapproximated with 4-0 Monocryl sutures and sterile skin glue applied.    Fay Faustin PA-C, was present and assisted the entire procedure. She assisted with abdominal entry, port placement, docking of robotic arms, adequate visualization, retraction, uterine manipulation, bedside assisting via assistant port, surgical specimen handling/retrieval, undocking of robotic arms, and skin closure.      The laparotomy sponge was removed from the vagina. The Palacios catheter was removed from the bladder.     All sponge, lap, needle and instrument counts were correct x 2. The patient tolerated the procedure well and there were no complications. She was returned to the supine position and general anesthesia was reversed without difficulty. She was taken to the recovery room in stable condition. I was present and scrubbed the entire procedure.    Nikki Hernandez MD  Gynecologic Oncology  [facility]  09/23/2024        "

## 2024-09-23 NOTE — ANESTHESIA PROCEDURE NOTES
Airway       Patient location during procedure: OR       Procedure Start/Stop Times: 9/23/2024 12:54 PM  Staff -        Anesthesiologist:  Shin Loyd MD       CRNA: Carlene Manzo APRN CRNA       Performed By: CRNA  Consent for Airway        Urgency: elective  Indications and Patient Condition       Indications for airway management: ashly-procedural       Induction type:intravenous       Mask difficulty assessment: 1 - vent by mask    Final Airway Details       Final airway type: endotracheal airway       Successful airway: ETT - single  Endotracheal Airway Details        ETT size (mm): 7.0       Cuffed: yes       Successful intubation technique: video laryngoscopy       VL Blade Size: Glidescope 3       Grade View of Cords: 1       Adjucts: stylet       Position: Right       Measured from: lips       Secured at (cm): 23       Bite block used: None    Post intubation assessment        Placement verified by: capnometry, equal breath sounds and chest rise        Number of attempts at approach: 1       Number of other approaches attempted: 0       Secured with: commercial tube dumont and tape       Ease of procedure: easy       Dentition: Intact and Unchanged    Medication(s) Administered   Medication Administration Time: 9/23/2024 12:54 PM

## 2024-09-23 NOTE — ANESTHESIA POSTPROCEDURE EVALUATION
Patient: Kasia Hernandez    Procedure: Procedure(s):  ROBOTIC-ASSISTED TOTAL LAPAROSCOPIC HYSTERECTOMY, BILATERAL SALPINGO-OOPHORECTOMY, PELVIC WASHINGS, INTRAOPERATIVE SENTINEL LYMPH NODE MAPPING  BILATERAL SENTINEL PELVIC AND RIGHT PARA-AORTIC SENTINEL LYMPH NODE DISSECTIONS  OMENTAL BIOPSY, RIGHT PELVIC PERITONEAL BIOPSY       Anesthesia Type:  General    Note:  Disposition: Admission   Postop Pain Control: Uneventful            Sign Out: Well controlled pain   PONV: No   Neuro/Psych: Uneventful            Sign Out: Acceptable/Baseline neuro status   Airway/Respiratory: Uneventful            Sign Out: Acceptable/Baseline resp. status   CV/Hemodynamics: Uneventful            Sign Out: Acceptable CV status   Other NRE: NONE   DID A NON-ROUTINE EVENT OCCUR? No           Last vitals:  Vitals Value Taken Time   /81 09/23/24 1645   Temp     Pulse 73 09/23/24 1659   Resp 7 09/23/24 1659   SpO2 97 % 09/23/24 1659   Vitals shown include unfiled device data.    Electronically Signed By: Shin Loyd MD  September 23, 2024  5:00 PM

## 2024-09-23 NOTE — PLAN OF CARE
Date & Time: 9/23/24, 7967-5986  Surgery/POD#: 0 from ROBOTIC-ASSISTED TOTAL LAPAROSCOPIC HYSTERECTOMY, BILATERAL SALPINGO-OOPHORECTOMY, PELVIC WASHINGS, INTRAOPERATIVE SENTINEL LYMPH NODE MAPPING BILATERAL SENTINEL PELVIC AND RIGHT PARA-AORTIC SENTINEL LYMPH NODE DISSECTIONS  OMENTAL BIOPSY, RIGHT PELVIC PERITONEAL BIOPSY  Behavior & Aggression: Green  Fall Risk: yes   Orientation:A&Ox4  ABNL VS/O2:VSS on 2L, ex HTN  ABNL Labs: see chart  Pain Management:mild soreness to abd, denied pain med  Bowel/Bladder: Void's adequate, -flatus/BM  Drains: PIV infusing  Wounds/incisions: 5 port sites & Vagina inc  Diet:Regular  Activity Level: A1 GB/IV pole  Tests/Procedures: na  Anticipated  DC Date: pending improvement  Significant Information: Plan to continue current care.

## 2024-09-23 NOTE — BRIEF OP NOTE
POST OPERATIVE NOTE-IMMEDIATE :    Procedures:  ROBOTIC-ASSISTED TOTAL LAPAROSCOPIC HYSTERECTOMY, BILATERAL SALPINGO-OOPHORECTOMY, PELVIC WASHINGS, INTRAOPERATIVE SENTINEL LYMPH NODE MAPPING  BILATERAL SENTINEL PELVIC AND RIGHT PARA-AORTIC SENTINEL LYMPH NODE DISSECTIONS  OMENTAL BIOPSY, RIGHT PELVIC PERITONEAL BIOPSY    Preoperative Diagnosis:  Endometrial cancer [C54.1]    Postoperative Diagnosis:  Same    Prosthetic Devices:  None    Surgeon(s) and Assistants (if any):  Surgeon(s):  Nikki Hernandez MD Trace, Fay Mar PA-C  Circulator: Herminia Kisre RN; Arti Lewis RN  Relief Circulator: Indira Vivar RN; Gilda Ernst RN  Scrub Person: Carmenza Syed; Clara Najera; Cee Adame    Anesthesia:  General    Estimated Blood Loss: 25 cc    IV Fluids: 1000 ml crystalloid    Urine Output: 650 ml clear urine    Drains: None    Specimens:    ID Type Source Tests Collected by Time Destination   1 : PELVIC WASHINGS Washings Pelvis NON-GYNECOLOGIC CYTOLOGY Nikki Hernandez MD 9/23/2024  1:39 PM    2 : RIGHT SENTINEL PELVIC LYMPH NODE Tissue Lymph Node(s), [place] SURGICAL PATHOLOGY EXAM Nikki Hernandez MD 9/23/2024  2:00 PM    3 : RIGHT PARA-AORTIC SENTINEL LYMPH NODE Tissue Lymph Node(s), Para-Aortic SURGICAL PATHOLOGY EXAM Nikki Hernandez MD 9/23/2024  2:09 PM    4 : LEFT SENTINEL PELVIC LYMPH NODE Tissue Lymph Node(s), [place] SURGICAL PATHOLOGY EXAM Nikki Hernandez MD 9/23/2024  2:26 PM    5 : OMENTAL BIOPSY Biopsy Omentum SURGICAL PATHOLOGY EXAM Nikki Hernandez MD 9/23/2024  2:42 PM    6 : UTERUS, CERVIX, BILATERAL FALLOPIAN TUBES, BILATERAL OVARIES Tissue Uterus, Cervix, Bilateral Fallopian Tubes & Ovaries SURGICAL PATHOLOGY EXAM Nikki Hernandez MD 9/23/2024  2:42 PM    7 : RIGHT PELVIC PERITONEUM BIOPSY Tissue Peritoneum SURGICAL PATHOLOGY EXAM Nikki Hernandez MD 9/23/2024  3:02 PM        Complications: None    Findings/Conclusions: On  pelvic examination under anesthesia, there were no lesions along bilateral vulva. There was excellent descensus of cervix. The cervix was grossly normal. The vaginal mucosa was without any gross abnormalities and had moderate atrophy. The uterus sounded to 9 cm.    On laparoscopy, there was no free fluid. There was good intraoperative sentinel lymph node mapping, bilaterally. The left pelvis had ICG mapping to distal external iliac artery. The right pelvic had ICG mapping to mid-common iliac artery. There was ICG mapping of right para-aortic lymph node ~1 cm above the pelvic brim, that was ~2 x 2 cm in conglomerate size. This lymph node appeared to be enlarged, but, also difficult to grossly tell if >1 lymph node or bundle of nodes. The uterus was moderately enlarged. There was a right lower uterine segment mass ~2 x 2 that was covered by serosa. There were no obvious masses within the serosal layer, at least, grossly. Bilateral adnexa were without any obvious abnormalities. Bilateral ureters were noted to be vermiculating and without evidence of hydronephrosis. The right pelvic peritoneum contained some small white, 1 mm implants that were biopsied. The remainder of the peritoneal surfaces were smooth, including bilateral hemidiaphragms. The liver edge, stomach and omentum were all grossly normal. The appendix was not visualized. The small bowel appeared grossly normal. There was diverticular disease along the colon, but, otherwise without any obvious abnormalities.     Interceed x 1 was placed over the right sentinel para-aortic lymph node dissection bed. Surgicell x 2 was placed over the remainder of the surgical sites. There was excellent hemostasis at the conclusion of the procedure.    Condition on discharge from OR:  Satisfactory    Nikki Hernandez MD  Gynecologic Oncology  [facility]  [facility]

## 2024-09-23 NOTE — PROGRESS NOTES
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 She is here with today with her daughter. She currently lives alone. Her  was moved into a memory care unit in 2024. She denies any history of issues with general anesthesia. She has a family history of melanoma and does receive skin checks with a dermatologist. Last was on 2023. Pet therapy dog, Anuel, was present during today's visit.     Genetic Testing (Gyn Oncology):  N/A    Review of Systems:  A complete 14-point review of systems is negative except as noted in the above history of present illness.    Past Medical History:  Basal cell carcinoma, SCCA on her face  Hypertension  Hyperlipidemia  Glaucoma  Arthritis  Vulvar BCCa    Surgical History:  Excision of multiple BCCa and SCCa  Left total knee arthoplasty  Modified radical right anterior vulvectomy - 2022  Bunionectomy  D&C  [place] tooth extraction    OB/Gyn History:      x 3  Menarche age x.  Age of 1st live birth:   Postmenopausal:  HIstory of OCPs usage:   Denies any HRT.  Denies any history of abnormal pap smears.    Allergies#  Amoxil and lisinopril    Medications:  Amiloride-Hydrochlorothiazide Oral 5 mg-50 mg QD am  Clindamycin Oral 300 mg capsule 4 capsule for dentist visits  Levothyroxine Oral 88 mcg tablet 1 tablet orally daily  Cholecalciferol Oral 1000 unit orally every day  Lipitor (Atorvastatin Oral) 20 mg tablet daily  Norvasc (Amlodipine Oral) 5 mg tablet 1 tablet orally daily  Cyanocobalamin Oral  Fluphenazine Oral 1  mg tablet 1 tablet orally one tab weekly  Toprol XL (Metoprolol Oral 24 hr Tab (Succinate)) 50 mg tablet extended release 24 hr 1 tablet extended release 24 hr orally 2 times per day  Latanoprost Ophthalmic Drops 0.005 % daily  Losartan Oral 100 mg tablet 1 tablet orally daily      Family History:  Malignant melanoma: mother and 2 of her children. One of her children passed away from melanoma in her 50s. The patient herself denies any hx of melanoma.    Social History:  . Retired. Lives alone.  moved into memory care unit in April 2024.  Former smoker. Moderate alcohol use.  Three children. four grandchildren.    Health Maintenance:  Mammogram: N/A secondary to age  Colonoscopy: N/A secondary to age    Vital Signs:  Blood pressure: 193/86, Pulse: 74, Temperature: 97.6 F, Respirations: 16, O2 sat: 95%, Pain Scale: 0, Height: 62 in, Weight: 137.6 lb, BSA: 1.63, BMI: 25.17 kg/m2    Physical Exam (Gyn Oncology):  General: alert, cooperative, no acute distress  HEENT: normocephalic, trachea midline, no thyromegaly  Lungs: no labored breathing on room air.  Cardiac: regular rate and rhythm  Abdomen: non-distended  Extremities: no edema  Neurologic Exam: no focal deficits  Pelvic: deferred to OR    Laboratory Data:  Endometrial biopsy was performed on 08/20/2024:   Final diagnosis:   1. Moderately differentiated endometrioid adenocarcinoma, FIGO grade II.            Imaging:  CT scan of CAP W contrast on 08/27/2024:   IMPRESSION:  1. Large uterine mass centered in the endometrium consistent with known endometrial cancer. The mass appears to invade the uterine  myometrium without adjacent organ invasion.  2. No definite evidence for metastatic disease in the chest, abdomen and pelvis.  3. A mildly enlarged portacaval lymph node is indeterminate, likely reactive. Recommend a follow-up CT in 3 months.  4. A cluster of tree-in-bud micronodules in the right upper lobe are likely inflammatory. A small indeterminate  left posterior pleural-based nodule. Follow-up chest CT in 3 months is recommended to ensure stability.  5. Mild age-indeterminate wedge compression fracture of T4, likely chronic. Correlate with pain in this area and if clinically indicated, consider follow-up thoracic spine MRI.      Pelvic ultrasound on 08/20/2024:   Impression:   1. Anteverted uterus 98 x 47 x 54 mm with 22 mm endometrial lining that is somewhat Ill-defined and areas that are calcified ovaries appear normal.    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 History of vulvar basal cell carcinoma s/p modified right anterior radical vulvectomy in 2022 with Dr. Villar. PORT[facility].    1. 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   Reviewed the pathology report.   Reviewed the imaging findings.   Reviewed that CT scan reveals a borderline enlarged aortocaval node, otherwise no obvious findings of metastatic disease.   Reviewed surgery as the gold standard of care given no findings of overt metastatic disease.   We discussed minimally invasive technique and use of robotic platform.   We discussed rationale behind lymph node assessment.   We discussed sentinel lymph node mapping technique and benefits of reduced lymphedema in lower extremities.   We reviewed alternatives, benefits, and risks to surgical procedure.   We reviewed alternatives are typically reserved for patients who desire fertility, have metastatic disease and/or surgically unfit. She does not appear to fit into any of these categories. She desires to proceed with standard of care surgery.   We discussed benefits include  "therapeutic removal of cancer, ability to pathologically stage, ability to inform prognosis, inability to inform possible adjuvant therapies.   We reviewed risks including, but not limited to: bleeding, infection, injury to surrounding structures, failure to cure. postoperative pain, discomfort, possible future hernia formation, possible increased risk of medical complications such as pneumonias or blood clots, and inherent risks to general anesthesia.   Patient will be amenable to blood transfusions in the event of an emergency.  We will plan for overnight observation as she lives alone. Will have family available to help her after discharge.   Patient had opportunity to meet with Gyn Onc RN for surgical education.   Patient had an opportunity to meet with surgical scheduler.   All questions answered to their satisfaction today.     2. Medical comorbidities:  Personal history of basal cell carcinoma, SCCA on her face, hypertension, hyperlipidemia, glaucoma, arthritis, vulvar BCCa    3. Preoperative considerations  Diagnosis Code: C54.1  Surgery: \"robotic-assisted total laparoscopic hysterectomy, bilateral salpingo-oophorectomy, pelvic washings, omental biopsy, intraoperative sentinel lymph node mapping, possible pelvic and para-aortic lymph node dissections, possible mini-laparotomy\"  Estimated Total Time:  Anesthesia: GENERAL  Alert Pathology for Frozen Section: NO  Bowel Prep: NO  Joint Case: NO  Patient Status: OBSERVATION  Preoperative Clearance Visit: YES  Labs on Day of Surgery: T&S & Hgb  Surgical Antibiotic Prophylaxis: IV Cefazolin 2 grams + IV Metronidazole 500 milligrams  VTE Prophylaxis: SQ Heparin 5,000 units + bilateral SCDs  Additional Considerations: plan for overnight observation, hx of vulvectomy w/ Dr. Villar for basal cell carcinoma of right vulva; borderline aortocaval lymph node on baseline CT imaging    TREATMENT PLAN AND CONSENT     Current treatment plan of upfront, definitive surgery for " treatment of endometrial cancer was reviewed in detail. Aware of possible need for adjuvant therapies following surgery. See counseling above. Specifically, the counseling included: goals of the treatment, prognosis, frequency, intended benefits, associated risks/harms and side effects and medically reasonable alternatives.    I spent a total of 60 minutes of cumulative time in care of this patient, which included >50% of time spent in direct patient care which included patient interview, examination, and treatment counseling. The remainder of the time was spent in medical documentation, review of records including previous operative note and care coordination.    I provided the patient an opportunity to ask questions and I answered all of their treatment related questions to the best of my ability. The patient expressed understanding and consent to this plan of care.    Pain Care Management:  Pain Scale: 0    Patient Care needs:  Depressions Status: Was screened; Outcome positive: No; Screening Date: 06/04/2024; Screening Tool: PRIME MD-PHQ2; Total depression score: 0  Smoking Status: Smoking Tobacco : Former smoker; Smokeless Tobacco : Never used smokeless tobacco; Vaping : Never vaped      The patient and family/friends if present were apprised of the use of [employer] remote documentation service and all parties consented to conducting the visit in this manner.    Documentation assistance provided by vinod Llamas for Nikki Hernandez MD on 09/03/2024.    I, Nikki Hernandez MD,  personally performed the services described in this documentation, and it is both accurate and complete.      Nikki Hernandez MD  Phone: [phone]  Fax: [phone]

## 2024-09-23 NOTE — PROGRESS NOTES
HPI  Kasia Hernandez is a 77 year old female here for comprehensive eye exam and glaucoma followup.  Minimal blurred vision both eyes with current glasses.     PMH:  hypertension  POH:  Glasses for myopic astigmatism, POAG, cataracts OU, no surgery, no trauma  Oc Meds:  Travatan Z ou q HS   FH:  Denies any glaucoma, age related macular degeneration, or other known eye diseases       Assessment & Plan      (H40.1111) Primary open angle glaucoma of right eye, mild stage - Both Eyes  (primary encounter diagnosis)  Comment:   --  K pachy:   573/572 (-2)   Tmax:  26 per old notes     HVF:  fluc     CDR:  0.3/0.35  HRT/OCT:   nl ou     FHX of Glc:  No        Gonio:       Intolerant to: BAK   Asthma/COPD:  no   Steroid Use:       Kidney Stones:       Sulfa Allergy:   no   IOP targets:  High 20s  Today's IOP: stable, good control       Plan: continue travatan Z both eyes at bedtime  OVF 24-2 Dynamic- done in may, not today, ordered in error today  OCT RNFL next visit    (H25.13) Nuclear sclerotic cataract of both eyes - Both Eyes  Comment: moderate, becoming visually significant, but meeting needs  Plan: follow    (H52.203,  H52.13) Myopic astigmatism of both eyes - Both Eyes  Comment: mild luciano  Plan: manifest refraction done and glasses prescription given   -----------------------------------------------------------------------------------    Patient disposition:   Return in about 4 months (around 1/4/2019) for follow up- POAG, iop check, , OCT nerve. Call for sooner appointment as needed.    Complete documentation of historical and exam elements from today's encounter can be found in the full encounter summary report (not reduplicated in this progress note). I personally obtained the chief complaint(s) and history of present illness.  I have confirmed and edited as necessary the CC, HPI, PMH/PSH, social history, FMH, ROS, and exam/neuro findings as obtained by the technician or others. I have examined this patient  Pt AAOx3. Resting in chair. Vs stable. Blood drawn from picc line and sterile dressing change. Pt tolerated well. No distress noted.  Pt ambulated out of infusion dept.     myself and I personally viewed the image(s) and studies listed above and the documentation reflects my findings and interpretation.  I formulated and edited as necessary the assessment and plan and discussed the findings and management plan with the patient and family.     Alivia Davalos MD

## 2024-09-23 NOTE — ANESTHESIA PREPROCEDURE EVALUATION
Anesthesia Pre-Procedure Evaluation    Patient: Kasia Hernandez   MRN: [id number] : 1940        Procedure : Procedure(s):  ROBOTIC ASSISTED TOTAL LAPAROSCOPIC HYSTERECTOMY , BILATERAL SALPINGO OOPHORECTOMY , PELVIC WASHINGS , OMENTAL BIOPSY , INTRAOPERATIVE SENTINEL LYMPH NODE MAPPING '  POSSIBLE PELVIC AND PARA AORTIC LYMPH NODE  DISSECTIONS  POSSIBLE MINI LAPAROTOMY          Past Medical History:   Diagnosis Date    BCC (basal cell carcinoma of skin)     Hypercholesterolemia     Hypertension     Hypothyroidism     Malignant neoplasm of body of uterus (H)     Mood disorder (H24)     Osteopenia     Primary open angle glaucoma     Senile nuclear sclerosis     Thyroid disease     Vitamin B12 deficiency (non anemic)     Vulvar cancer (H)       Past Surgical History:   Procedure Laterality Date    BUNIONECTOMY Right     CATARACT IOL, RT/LT      D & C      EXCISE LESION EYELID Right 2020    Procedure: EXCISION RIGHT LATERAL CANTHUS LESION WITH FROZEN SECTION CONTROL;  Surgeon: Higinio Cross MD;  Location: [facility] OR    REMOVE HARDWARE FOOT Right 2019    REPLACEMENT TOTAL KNEE Left 2015    VULVECTOMY SIMPLE N/A 2022    Procedure: MODIFIED RADICAL RIGHT ANTERIOR VULVECTOMY;  Surgeon: Elke Villar MD;  Location: [facility] OR      Allergies   Allergen Reactions    Lisinopril Cough    Oxycodone Nausea and Vomiting     TOLERATES HYDROCODONE     Amoxicillin Rash      Social History     Tobacco Use    Smoking status: Former     Current packs/day: 0.00     Average packs/day: 0.5 packs/day for 20.0 years (10.0 ttl pk-yrs)     Types: Cigarettes     Start date: 1980     Quit date: 2000     Years since quittin.7    Smokeless tobacco: Never   Substance Use Topics    Alcohol use: Yes     Comment: 1 glass of wine per week      Wt Readings from Last 1 Encounters:   24 61.2 kg (135 lb)        Anesthesia Evaluation   Pt has had prior anesthetic. Type: General.    No history of  "anesthetic complications       ROS/MED HX  ENT/Pulmonary:     (+)                tobacco use, Past use,                    (-) asthma and sleep apnea   Neurologic:     (+)    no peripheral neuropathy                         (-) no seizures and no CVA   Cardiovascular:     (+) Dyslipidemia hypertension- -   -  - -                                   (-) CAD and arrhythmias   METS/Exercise Tolerance: >4 METS    Hematologic:       Musculoskeletal:       GI/Hepatic:    (-) GERD   Renal/Genitourinary:    (-) renal disease   Endo:     (+)          thyroid problem, hypothyroidism,        (-) Type II DM   Psychiatric/Substance Use:       Infectious Disease:    (-) Recent Fever   Malignancy: Comment: Endometrial cancer [C54.1]        Other:            Physical Exam    Airway  airway exam normal      Mallampati: III   TM distance: > 3 FB   Neck ROM: full   Mouth opening: > 3 cm    Respiratory Devices and Support         Dental       (+) Minor Abnormalities - some fillings, tiny chips      Cardiovascular   cardiovascular exam normal          Pulmonary   pulmonary exam normal                OUTSIDE LABS:  CBC:   Lab Results   Component Value Date    HGB 13.6 09/23/2024     BMP:   Lab Results   Component Value Date    POTASSIUM 3.9 09/23/2024    POTASSIUM 3.5 09/01/2022    CR 0.7 08/27/2024     COAGS: No results found for: \"PTT\", \"INR\", \"FIBR\"  POC: No results found for: \"BGM\", \"HCG\", \"HCGS\"  HEPATIC: No results found for: \"ALBUMIN\", \"PROTTOTAL\", \"ALT\", \"AST\", \"GGT\", \"ALKPHOS\", \"BILITOTAL\", \"BILIDIRECT\", \"NU\"  OTHER: No results found for: \"PH\", \"LACT\", \"A1C\", \"KATHY\", \"PHOS\", \"MAG\", \"LIPASE\", \"AMYLASE\", \"TSH\", \"T4\", \"T3\", \"CRP\", \"SED\"    Anesthesia Plan    ASA Status:  2    NPO Status:  NPO Appropriate    Anesthesia Type: General.     - Airway: ETT   Induction: Intravenous.   Maintenance: Balanced.   Techniques and Equipment:     - Airway: Video-Laryngoscope       Consents    Anesthesia Plan(s) and associated risks, benefits, and " realistic alternatives discussed. Questions answered and patient/representative(s) expressed understanding.     - Discussed:     - Discussed with:  Patient            Postoperative Care    Pain management: IV analgesics, Oral pain medications.   PONV prophylaxis: Ondansetron (or other 5HT-3), Dexamethasone or Solumedrol, Background Propofol Infusion     Comments:    Other Comments: No versed           Shin Loyd MD    I have reviewed the pertinent notes and labs in the chart from the past 30 days and (re)examined the patient.  Any updates or changes from those notes are reflected in this note.

## 2024-09-24 ENCOUNTER — DOCUMENTATION ONLY (OUTPATIENT)
Dept: OTHER | Facility: CLINIC | Age: 84
End: 2024-09-24
Payer: MEDICARE

## 2024-09-24 VITALS
BODY MASS INDEX: 24.84 KG/M2 | DIASTOLIC BLOOD PRESSURE: 72 MMHG | HEART RATE: 70 BPM | HEIGHT: 62 IN | OXYGEN SATURATION: 94 % | SYSTOLIC BLOOD PRESSURE: 147 MMHG | TEMPERATURE: 99.3 F | RESPIRATION RATE: 16 BRPM | WEIGHT: 135 LBS

## 2024-09-24 LAB
ANION GAP SERPL CALCULATED.3IONS-SCNC: 10 MMOL/L (ref 7–15)
BUN SERPL-MCNC: 11 MG/DL (ref 8–23)
CALCIUM SERPL-MCNC: 8.6 MG/DL (ref 8.8–10.4)
CHLORIDE SERPL-SCNC: 102 MMOL/L (ref 98–107)
CREAT SERPL-MCNC: 0.63 MG/DL (ref 0.51–0.95)
EGFRCR SERPLBLD CKD-EPI 2021: 87 ML/MIN/1.73M2
ERYTHROCYTE [DISTWIDTH] IN BLOOD BY AUTOMATED COUNT: 13.5 % (ref 10–15)
GLUCOSE BLDC GLUCOMTR-MCNC: 106 MG/DL (ref 70–99)
GLUCOSE SERPL-MCNC: 121 MG/DL (ref 70–99)
HCO3 SERPL-SCNC: 26 MMOL/L (ref 22–29)
HCT VFR BLD AUTO: 36.9 % (ref 35–47)
HGB BLD-MCNC: 12.6 G/DL (ref 11.7–15.7)
MCH RBC QN AUTO: 31.9 PG (ref 26.5–33)
MCHC RBC AUTO-ENTMCNC: 34.1 G/DL (ref 31.5–36.5)
MCV RBC AUTO: 93 FL (ref 78–100)
PLATELET # BLD AUTO: 264 10E3/UL (ref 150–450)
POTASSIUM SERPL-SCNC: 3.9 MMOL/L (ref 3.4–5.3)
RBC # BLD AUTO: 3.95 10E6/UL (ref 3.8–5.2)
SODIUM SERPL-SCNC: 138 MMOL/L (ref 135–145)
WBC # BLD AUTO: 15.3 10E3/UL (ref 4–11)

## 2024-09-24 PROCEDURE — 250N000013 HC RX MED GY IP 250 OP 250 PS 637: Performed by: PHYSICIAN ASSISTANT

## 2024-09-24 PROCEDURE — 85027 COMPLETE CBC AUTOMATED: CPT | Performed by: PHYSICIAN ASSISTANT

## 2024-09-24 PROCEDURE — 36415 COLL VENOUS BLD VENIPUNCTURE: CPT | Performed by: PHYSICIAN ASSISTANT

## 2024-09-24 PROCEDURE — 258N000003 HC RX IP 258 OP 636: Performed by: PHYSICIAN ASSISTANT

## 2024-09-24 PROCEDURE — 80048 BASIC METABOLIC PNL TOTAL CA: CPT | Performed by: PHYSICIAN ASSISTANT

## 2024-09-24 RX ORDER — HYDROMORPHONE HYDROCHLORIDE 2 MG/1
2 TABLET ORAL EVERY 4 HOURS PRN
Qty: 10 TABLET | Refills: 0 | Status: SHIPPED | OUTPATIENT
Start: 2024-09-24

## 2024-09-24 RX ORDER — AMOXICILLIN 250 MG
1-2 CAPSULE ORAL 2 TIMES DAILY
Qty: 30 TABLET | Refills: 0 | Status: SHIPPED | OUTPATIENT
Start: 2024-09-24

## 2024-09-24 RX ADMIN — METOPROLOL SUCCINATE 25 MG: 25 TABLET, EXTENDED RELEASE ORAL at 07:54

## 2024-09-24 RX ADMIN — ACETAMINOPHEN 650 MG: 325 TABLET ORAL at 07:54

## 2024-09-24 RX ADMIN — LOSARTAN POTASSIUM 100 MG: 100 TABLET, FILM COATED ORAL at 07:54

## 2024-09-24 RX ADMIN — LEVOTHYROXINE SODIUM 88 MCG: 88 TABLET ORAL at 07:53

## 2024-09-24 RX ADMIN — SPIRONOLACTONE 25 MG: 25 TABLET ORAL at 07:53

## 2024-09-24 RX ADMIN — AMLODIPINE BESYLATE 5 MG: 5 TABLET ORAL at 07:54

## 2024-09-24 RX ADMIN — SODIUM CHLORIDE: 9 INJECTION, SOLUTION INTRAVENOUS at 03:16

## 2024-09-24 ASSESSMENT — ACTIVITIES OF DAILY LIVING (ADL)
ADLS_ACUITY_SCORE: 30
ADLS_ACUITY_SCORE: 26
ADLS_ACUITY_SCORE: 30
ADLS_ACUITY_SCORE: 26
DEPENDENT_IADLS:: INDEPENDENT
ADLS_ACUITY_SCORE: 26
ADLS_ACUITY_SCORE: 26
ADLS_ACUITY_SCORE: 30

## 2024-09-24 NOTE — PROGRESS NOTES
Patient discharged at 1:22 PM to Discharged to home w/ home care. IV was discontinued. Pain at time of discharge was 0/10. Belongings returned to patient.  Discharge instructions and medications reviewed with patient.  Patient verbalized understanding and all questions were answered.  Prescriptions given to patient.  At time of discharge, patient condition was stable and left the unit by wheelchair with daughter escorted by staff.

## 2024-09-24 NOTE — PLAN OF CARE
Goal Outcome Evaluation:      Plan of Care Reviewed With: patient    Overall Patient Progress: improvingOverall Patient Progress: improving    POD 1 ROBOTIC-ASSISTED TOTAL LAPAROSCOPIC HYSTERECTOMY, BILATERAL SALPINGO-OOPHORECTOMY, PELVIC WASHINGS, INTRAOPERATIVE SENTINEL LYMPH NODE MAPPING BILATERAL SENTINEL PELVIC AND RIGHT PARA-AORTIC SENTINEL LYMPH NODE DISSECTIONS, OMENTAL BIOPSY, RIGHT PELVIC PERITONEAL BIOPSY. A&Ox4. VSS on 1L NC. Pain managed with PRN tylenol. PIV infusing NS @100ml/hr. Void adequately. Adudbale BS, passing flatus, no BM. 5 lap sites CDI. Up SBA w/ gt/iv pole. Discharge pending.

## 2024-09-24 NOTE — UTILIZATION REVIEW
Admission Status; Secondary Review Determination       Under the authority of the Utilization Management Committee, the utilization review process indicated a secondary review on the above patient. The review outcome is based on review of the medical records, discussions with staff, and applying clinical experience noted on the date of the review.     (x) Outpatient Status with extended recovery is appropriate - This patient does not meet hospital inpatient criteria. If this patient's primary payer is Medicare and was admitted as an inpatient, Condition Code 44 should be used and patient status changed to outpatient recovery.    RATIONALE FOR DETERMINATION   83 year old female admitted following robotic assisted total laparoscopic hysterectomy, bilateral salpingo-oophorectomy, sentinel lymph node mapping and biopsy, omental biopsy, pelvic peritoneal biopsy, pelvic washings.  Patient was admitted to the hospital after the procedure. Patient has Medicare and the procedure is not on the CMS inpatient list. No documented complications or unexpected recovery. Patient can be safely  monitored for bleeding and recover in outpatient/extended recovery setting. The severity of illness, intensity of service provided, expected LOS and risk for adverse outcome doesn't meet inpatient hospital admission.  [employer] PA  notified.    This document was produced using voice recognition software.     The information on this document is developed by the utilization review team in order for the business office to ensure compliance. This only denotes the appropriateness of proper admission status and does not reflect the quality of care rendered.   The definitions of Inpatient Status and Observation Status used in making the determination above are those provided in the CMS Coverage Manual, Chapter 1 and Chapter 6, section 70.4.   Sincerely,   HEMAL THOMAS MD   System Medical Director   Utilization Management    [facility].

## 2024-09-24 NOTE — PROGRESS NOTES
Care Management Discharge Note    Discharge Date: 09/24/2024       Discharge Disposition: Home Care    Discharge Services:      Discharge DME:      Discharge Transportation:      Private pay costs discussed: Not applicable    Does the patient's insurance plan have a 3 day qualifying hospital stay waiver?  No    PAS Confirmation Code:    Patient/family educated on Medicare website which has current facility and service quality ratings:      Education Provided on the Discharge Plan:    Persons Notified of Discharge Plans: Nurse, HUC, charge, Patient  Patient/Family in Agreement with the Plan: yes    Handoff Referral Completed: No, handoff not indicated or clinically appropriate    Additional Information:  Writer sent home care discharge orders and waiting for provider to sign a copy of the POLST form located in patients file so that patient can have a copy     SHYLA Celeste

## 2024-09-24 NOTE — PLAN OF CARE
Goal Outcome Evaluation:      Plan of Care Reviewed With: patient          Outcome Evaluation: patient will discharge home with home care

## 2024-09-24 NOTE — CONSULTS
Care Management Initial Consult    General Information  Assessment completed with: Patient,    Type of CM/SW Visit: Initial Assessment    Primary Care Provider verified and updated as needed: Yes   Readmission within the last 30 days:        Reason for Consult: discharge planning  Advance Care Planning: Advance Care Planning Reviewed: other (see comments) (completed and we were given a copy)          Communication Assessment  Patient's communication style: spoken language (English or Bilingual)    Hearing Difficulty or Deaf: no   Wear Glasses or Blind: no    Cognitive  Cognitive/Neuro/Behavioral: WDL                      Living Environment:   People in home: alone     Current living Arrangements:        Able to return to prior arrangements: yes       Family/Social Support:  Care provided by: self, child(anupama), homecare agency  Provides care for: no one  Marital Status:   Support system: Children          Description of Support System: Supportive, Involved    Support Assessment: Adequate family and caregiver support    Current Resources:   Patient receiving home care services:          Community Resources:    Equipment currently used at home: none  Supplies currently used at home:      Employment/Financial:  Employment Status: retired, homemaker        Financial Concerns:     Referral to Financial Worker: No       Does the patient's insurance plan have a 3 day qualifying hospital stay waiver?  No    Lifestyle & Psychosocial Needs:  Social Determinants of Health     Food Insecurity: Low Risk  (9/23/2024)    Food Insecurity     Within the past 12 months, did you worry that your food would run out before you got money to buy more?: No     Within the past 12 months, did the food you bought just not last and you didn t have money to get more?: No   Depression: Not at risk (1/8/2024)    [facility]    PHQ-2     PHQ-2 TOTAL SCORE: 0   Housing Stability: Low Risk   (9/23/2024)    Housing Stability     Do you have housing? : Yes     Are you worried about losing your housing?: No   Tobacco Use: Medium Risk (9/23/2024)    Patient History     Smoking Tobacco Use: Former     Smokeless Tobacco Use: Never     Passive Exposure: Not on file   Financial Resource Strain: Low Risk  (9/23/2024)    Financial Resource Strain     Within the past 12 months, have you or your family members you live with been unable to get utilities (heat, electricity) when it was really needed?: No   Alcohol Use: Not on file   Transportation Needs: Low Risk  (9/23/2024)    Transportation Needs     Within the past 12 months, has lack of transportation kept you from medical appointments, getting your medicines, non-medical meetings or appointments, work, or from getting things that you need?: No   Physical Activity: Not on file   Interpersonal Safety: Low Risk  (9/23/2024)    Interpersonal Safety     Do you feel physically and emotionally safe where you currently live?: Yes     Within the past 12 months, have you been hit, slapped, kicked or otherwise physically hurt by someone?: No     Within the past 12 months, have you been humiliated or emotionally abused in other ways by your partner or ex-partner?: No   Stress: Not on file   Social Connections: Unknown (12/22/2021)    Received from [email or web address] [facility]    Social Connections     Frequency of Communication with Friends and Family: Not on file   Health Literacy: Not on file       Functional Status:  Prior to admission patient needed assistance:   Dependent ADLs:: Independent  Dependent IADLs:: Independent       Mental Health Status:  Mental Health Status: No Current Concerns       Chemical Dependency Status:  Chemical Dependency Status: No Current Concerns             Values/Beliefs:  Spiritual, Cultural Beliefs, Taoism Practices, Values that affect care:                 Discussed  Partnership in Safe Discharge Planning  document  with patient/family: No    Additional Information:  Consult for Health Care Directive/discharge planning. 83 year old female patient admitted on 09/23/2024 for a hysterectomy. Consult for discharge planning. Writer reviewed chart and recommendations at discharge. Writer met with patient at bedside and introduced self and role. Writer confirmed patient's primary doctor and home address as accurate. Patient lives alone as her spouse had a stroke and now lives in memory care. Patients daughter Zeenat has paid fir a nurse to come and look after her patient but they are also interested in home care being set up. Patient needs a POLST form filled out for the nursing team. Writer paged provider for assistance with the POLST and discharge orders.       Next Steps: Home care referral.      SHYLA Celeste

## 2024-09-24 NOTE — DISCHARGE SUMMARY
"HOSPITAL DISCHARGE SUMMARY    Patient Name: Kasia Hernandez  YOB: 1940 Age: 83 year old  Medical Record Number: [id number]  Primary Physician: Ulysses Ellison  Phone: [phone]  Admission Date: 9/23/2024  Discharge Date: 9/24/2024    Kasia Hernandez  will be discharged from [facility] to Home.    PRINCIPAL DISCHARGE DIAGNOSIS: endometrial cancer    BRIEF HOSPITAL COURSE: This 83 year old female admitted following robotic assisted total laparoscopic hysterectomy, bilateral salpingo-oophorectomy, sentinel lymph node mapping and biopsy, omental biopsy, pelvic peritoneal biopsy, pelvic washings. She tolerated the procedure well. Uneventful post operative course and discharge to home on POD #1 with adequate pain control, tolerating orals, voiding and ambulating.    PROCEDURES PERFORMED DURING HOSPITALIZATION:   Robotic assisted laparoscopic hysterectomy  Bilateral salpingo-oophorectomy  Bilateral sentinel lymph node mapping and biopsy  Omental biopsy  Pelvic peritoneal biopsy     COMPLICATIONS IN HOSPITAL: None    CONSULTATIONS:  Social work    PERTINENT FINDINGS/RESULTS AT DISCHARGE:   BP (!) 149/74   Pulse 65   Temp 99.2  F (37.3  C) (Oral)   Resp 16   Ht 1.575 m (5' 2\")   Wt 61.2 kg (135 lb)   SpO2 95%   BMI 24.69 kg/m      Latest Laboratory Results:  Chem:  CBC RESULTS:   Recent Labs   Lab Test 09/24/24  0623   WBC 15.3*   RBC 3.95   HGB 12.6   HCT 36.9   MCV 93   MCH 31.9   MCHC 34.1   RDW 13.5        Last Basic Metabolic Panel:  Lab Results   Component Value Date     09/24/2024      Lab Results   Component Value Date    POTASSIUM 3.9 09/24/2024    POTASSIUM 3.5 09/01/2022     Lab Results   Component Value Date    CHLORIDE 102 09/24/2024     Lab Results   Component Value Date    KATHY 8.6 09/24/2024     Lab Results   Component Value Date    CO2 26 09/24/2024     Lab Results   Component Value Date    BUN 11.0 09/24/2024     Lab Results   Component Value Date "    CR 0.63 09/24/2024     Lab Results   Component Value Date     09/24/2024     09/24/2024         IMPORTANT PENDING TEST RESULTS:  Pathology    CONDITION AT DISCHARGE:    Stabilized    DISCHARGE ORDERS  Current Discharge Medication List        START taking these medications    Details   HYDROmorphone (DILAUDID) 2 MG tablet Take 1 tablet (2 mg) by mouth every 4 hours as needed for moderate to severe pain.  Qty: 10 tablet, Refills: 0    Associated Diagnoses: Endometrial cancer      senna-docusate (SENOKOT-S/PERICOLACE) 8.6-50 MG tablet Take 1-2 tablets by mouth 2 times daily. Take while on oral narcotics to prevent or treat constipation.  Qty: 30 tablet, Refills: 0    Comments: While taking narcotics  Associated Diagnoses: Endometrial cancer           CONTINUE these medications which have NOT CHANGED    Details   acetaminophen (TYLENOL) 500 MG tablet Take 500-1,000 mg by mouth every 6 hours as needed for mild pain.      amLODIPine (NORVASC) 5 MG tablet Take 5 mg by mouth every morning      atorvastatin (LIPITOR) 20 MG tablet Take 20 mg by mouth At Bedtime      clindamycin (CLEOCIN) 300 MG capsule Take 600 mg by mouth daily as needed (2 X 300 mg) 1 hour prior to dental appointments.      cyanocobalamin (VITAMIN B-12) 1000 MCG tablet Take 1,000 mcg by mouth daily      fluPHENAZine (PROLIXIN) 1 MG tablet Take 1 mg by mouth once a week. FRIDAYS      latanoprost (XALATAN) 0.005 % ophthalmic solution Place 1 drop into both eyes At Bedtime      levothyroxine (SYNTHROID/LEVOTHROID) 88 MCG tablet Take 88 mcg by mouth daily Use 2 extra doses weekly as directed.      losartan (COZAAR) 100 MG tablet Take 100 mg by mouth every morning      metoprolol succinate ER (TOPROL XL) 50 MG 24 hr tablet Take 25 mg by mouth every morning (0.5 X 50 mg)      multiple vitamin  tablet TABS Take 1 tablet by mouth daily.      spironolactone (ALDACTONE) 25 MG tablet Take 25 mg by mouth daily.      Vitamin D3 (CHOLECALCIFEROL) 25 mcg  (1000 units) tablet Take 1 tablet by mouth daily             DISCHARGE INSTRUCTION.  Returning to work/activities:  -Nothing per vagina for 8 weeks  -Typically patients can expect to return to light work within 2-4 weeks.  -No driving or operating machinery while taking prescription pain medication.  -You should avoid heavy lifting until your follow up visit. No lifting greater than 10 pounds for 6-8 weeks.     Diet:  -as tolerated    Pain:  -Motrin (or other NSAIDs) are a good additional therapy and recommend trying this in addition to other pain relievers.  -Typically there is a minimal to moderate amount of incisional pain after surgery.  - An ice pack is recommended intermittently for the first 48 hours to help reduce pain & swelling.  -Most patients are provided a prescription for medication to take on a short term basis to help relieve the discomfort.  -If pain medication refills are needed we require you contact our office during regular business hours. (8am-5pm Monday-Friday)  -Please be aware that pain medication can cause constipation.  It may be recommended to take an over the counter stool softener as directed to prevent constipation.  -You may also have bladder irritation from your surgery and this is normal. Please call if you have blood in your urine, fevers, or are unable to empty your bladder.     Incision/Wound care:  -A small amount of bleeding or drainage is expected in the first 24-48 hours.  -You many shower 24hrs after surgery.  It is ok to get the steri-strips/incision wet while showering & pat the area dry with a clean towel  -No bathtubs, hot tubs or swimming is recommended for at least 2 weeks.  -Expect to have vaginal drainage/spotting for up to 4-6 weeks from surgery. If more than a regular period please call our office.       Follow up care:  -You will be asked to see Dr. Hernandez's office in 2 and 6 weeks  -If you don't already have an appointment, please contact the office and our staff  will happily assist you in scheduling your appointment. Bring your insurance card & government issued ID card to your appointment.    CALL YOUR SURGEON @ [phone] FOR ANY OF THE FOLLOWING:  -Temperature greater than 101 degrees Fahrenheit  -Increased pain  -Increased shortness of breath  -Increased bleeding or drainage or vaginal bleeding greater than a regular menses.   -Pus-like drainage, increasing redness, swelling, tenderness, or warmth at the incision site  -Persistent nausea or vomiting  -Difficulty having a bowel movement    FOLLOW-UP: Kasia Hernandez should see Ulysses Ellison.    Specialty follow-up: as above.     AFTER HOSPITAL RECOMMENDATIONS  As above.      Physician(s) in addition to primary physician who should receive a copy:  Ulysses Ellison PA-C

## 2024-09-25 LAB
INTERPRETATION: NORMAL
PATH REPORT.COMMENTS IMP SPEC: ABNORMAL
PATH REPORT.COMMENTS IMP SPEC: NORMAL
PATH REPORT.COMMENTS IMP SPEC: YES
PATH REPORT.FINAL DX SPEC: ABNORMAL
PATH REPORT.FINAL DX SPEC: NORMAL
PATH REPORT.GROSS SPEC: ABNORMAL
PATH REPORT.GROSS SPEC: NORMAL
PATH REPORT.MICROSCOPIC SPEC OTHER STN: ABNORMAL
PATH REPORT.MICROSCOPIC SPEC OTHER STN: NORMAL
PATH REPORT.RELEVANT HX SPEC: ABNORMAL
PATH REPORT.RELEVANT HX SPEC: NORMAL
PATHOLOGY SYNOPTIC REPORT: ABNORMAL
PHOTO IMAGE: ABNORMAL
SIGNIFICANT RESULTS: NORMAL
SPECIMEN DESCRIPTION: NORMAL
TEST DETAILS, MDL: NORMAL

## 2024-09-25 PROCEDURE — 88305 TISSUE EXAM BY PATHOLOGIST: CPT | Mod: 26 | Performed by: PATHOLOGY

## 2024-09-25 PROCEDURE — 88307 TISSUE EXAM BY PATHOLOGIST: CPT | Mod: 26 | Performed by: PATHOLOGY

## 2024-09-25 PROCEDURE — 88309 TISSUE EXAM BY PATHOLOGIST: CPT | Mod: 26 | Performed by: PATHOLOGY

## 2024-09-26 PROCEDURE — 81351 TP53 GENE FULL GENE SEQUENCE: CPT | Performed by: OBSTETRICS & GYNECOLOGY

## 2024-09-26 PROCEDURE — G0452 MOLECULAR PATHOLOGY INTERPR: HCPCS | Mod: 26 | Performed by: PATHOLOGY

## 2024-09-26 PROCEDURE — 81479 UNLISTED MOLECULAR PATHOLOGY: CPT | Performed by: OBSTETRICS & GYNECOLOGY

## 2024-10-03 ENCOUNTER — DOCUMENTATION ONLY (OUTPATIENT)
Dept: OTHER | Facility: CLINIC | Age: 84
End: 2024-10-03
Payer: MEDICARE

## 2024-10-07 ENCOUNTER — DOCUMENTATION ONLY (OUTPATIENT)
Dept: OTHER | Facility: CLINIC | Age: 84
End: 2024-10-07
Payer: MEDICARE

## 2024-10-21 ENCOUNTER — TRANSFERRED RECORDS (OUTPATIENT)
Dept: HEALTH INFORMATION MANAGEMENT | Facility: CLINIC | Age: 84
End: 2024-10-21
Payer: MEDICARE

## 2024-11-11 ENCOUNTER — APPOINTMENT (OUTPATIENT)
Dept: URBAN - METROPOLITAN AREA CLINIC 256 | Age: 84
Setting detail: DERMATOLOGY
End: 2024-11-11

## 2024-11-11 VITALS — WEIGHT: 137 LBS | HEIGHT: 62 IN

## 2024-11-11 DIAGNOSIS — Z71.89 OTHER SPECIFIED COUNSELING: ICD-10-CM

## 2024-11-11 DIAGNOSIS — L82.1 OTHER SEBORRHEIC KERATOSIS: ICD-10-CM

## 2024-11-11 DIAGNOSIS — L82.0 INFLAMED SEBORRHEIC KERATOSIS: ICD-10-CM

## 2024-11-11 DIAGNOSIS — D22 MELANOCYTIC NEVI: ICD-10-CM

## 2024-11-11 DIAGNOSIS — D18.0 HEMANGIOMA: ICD-10-CM

## 2024-11-11 DIAGNOSIS — L57.8 OTHER SKIN CHANGES DUE TO CHRONIC EXPOSURE TO NONIONIZING RADIATION: ICD-10-CM

## 2024-11-11 DIAGNOSIS — L57.0 ACTINIC KERATOSIS: ICD-10-CM

## 2024-11-11 PROBLEM — D22.72 MELANOCYTIC NEVI OF LEFT LOWER LIMB, INCLUDING HIP: Status: ACTIVE | Noted: 2024-11-11

## 2024-11-11 PROBLEM — D22.62 MELANOCYTIC NEVI OF LEFT UPPER LIMB, INCLUDING SHOULDER: Status: ACTIVE | Noted: 2024-11-11

## 2024-11-11 PROBLEM — D22.39 MELANOCYTIC NEVI OF OTHER PARTS OF FACE: Status: ACTIVE | Noted: 2024-11-11

## 2024-11-11 PROBLEM — D22.71 MELANOCYTIC NEVI OF RIGHT LOWER LIMB, INCLUDING HIP: Status: ACTIVE | Noted: 2024-11-11

## 2024-11-11 PROBLEM — D22.5 MELANOCYTIC NEVI OF TRUNK: Status: ACTIVE | Noted: 2024-11-11

## 2024-11-11 PROBLEM — D22.61 MELANOCYTIC NEVI OF RIGHT UPPER LIMB, INCLUDING SHOULDER: Status: ACTIVE | Noted: 2024-11-11

## 2024-11-11 PROBLEM — D18.01 HEMANGIOMA OF SKIN AND SUBCUTANEOUS TISSUE: Status: ACTIVE | Noted: 2024-11-11

## 2024-11-11 PROCEDURE — OTHER LIQUID NITROGEN: OTHER

## 2024-11-11 PROCEDURE — 17000 DESTRUCT PREMALG LESION: CPT | Mod: 59

## 2024-11-11 PROCEDURE — 17110 DESTRUCT B9 LESION 1-14: CPT

## 2024-11-11 PROCEDURE — OTHER MIPS QUALITY: OTHER

## 2024-11-11 PROCEDURE — OTHER COUNSELING: OTHER

## 2024-11-11 PROCEDURE — 99213 OFFICE O/P EST LOW 20 MIN: CPT | Mod: 25

## 2024-11-11 PROCEDURE — 17003 DESTRUCT PREMALG LES 2-14: CPT | Mod: 59

## 2024-11-11 ASSESSMENT — LOCATION SIMPLE DESCRIPTION DERM
LOCATION SIMPLE: RIGHT CHEEK
LOCATION SIMPLE: RIGHT FOREARM
LOCATION SIMPLE: ABDOMEN
LOCATION SIMPLE: LEFT LOWER BACK
LOCATION SIMPLE: RIGHT POSTERIOR THIGH
LOCATION SIMPLE: LEFT POSTERIOR THIGH
LOCATION SIMPLE: RIGHT UPPER ARM
LOCATION SIMPLE: LEFT THIGH
LOCATION SIMPLE: NECK
LOCATION SIMPLE: LEFT FOREARM
LOCATION SIMPLE: RIGHT THIGH
LOCATION SIMPLE: LEFT UPPER ARM
LOCATION SIMPLE: LEFT CHEEK
LOCATION SIMPLE: LEFT HAND

## 2024-11-11 ASSESSMENT — LOCATION DETAILED DESCRIPTION DERM
LOCATION DETAILED: LEFT CENTRAL MALAR CHEEK
LOCATION DETAILED: RIGHT CENTRAL MALAR CHEEK
LOCATION DETAILED: LEFT INFERIOR MEDIAL MIDBACK
LOCATION DETAILED: RIGHT ANTERIOR DISTAL THIGH
LOCATION DETAILED: RIGHT DISTAL POSTERIOR THIGH
LOCATION DETAILED: RIGHT CENTRAL LATERAL NECK
LOCATION DETAILED: RIGHT VENTRAL DISTAL FOREARM
LOCATION DETAILED: LEFT DISTAL POSTERIOR UPPER ARM
LOCATION DETAILED: LEFT LATERAL ABDOMEN
LOCATION DETAILED: LEFT VENTRAL PROXIMAL FOREARM
LOCATION DETAILED: LEFT ANTERIOR PROXIMAL THIGH
LOCATION DETAILED: LEFT CENTRAL LATERAL NECK
LOCATION DETAILED: LEFT DISTAL POSTERIOR THIGH
LOCATION DETAILED: LEFT INFERIOR CENTRAL MALAR CHEEK
LOCATION DETAILED: RIGHT DISTAL POSTERIOR UPPER ARM
LOCATION DETAILED: LEFT DORSAL INDEX METACARPOPHALANGEAL JOINT
LOCATION DETAILED: LEFT SUPERIOR CENTRAL MALAR CHEEK

## 2024-11-11 ASSESSMENT — LOCATION ZONE DERM
LOCATION ZONE: LEG
LOCATION ZONE: ARM
LOCATION ZONE: FACE
LOCATION ZONE: TRUNK
LOCATION ZONE: NECK
LOCATION ZONE: HAND

## 2024-11-11 NOTE — HPI: FULL BODY SKIN EXAMINATION
What Is The Reason For Today's Visit?: Full Body Skin Examination
What Is The Reason For Today's Visit? (Being Monitored For X): concerning skin lesions on an annual basis
Additional History: Spot behind right neck

## 2024-11-11 NOTE — PROCEDURE: LIQUID NITROGEN
Consent: The patient's verbal consent was obtained including but not limited to risks of crusting, scabbing, blistering, scarring, darker or lighter pigmentary change, recurrence, incomplete removal and infection.
Detail Level: Simple
Render Post-Care Instructions In Note?: no
Show Aperture Variable?: Yes
Number Of Freeze-Thaw Cycles: 1 freeze-thaw cycle
Post-Care Instructions: I reviewed with the patient in detail post-care instructions. Patient is to wear sunprotection, and avoid picking at any of the treated lesions. Pt may apply Vaseline to crusted or scabbing areas.
Duration Of Freeze Thaw-Cycle (Seconds): 3
Spray Paint Text: The liquid nitrogen was applied to the skin utilizing a spray paint frosting technique.
Medical Necessity Clause: This procedure was medically necessary because the lesions that were treated were:
Medical Necessity Information: It is in your best interest to select a reason for this procedure from the list below. All of these items fulfill various CMS LCD requirements except the new and changing color options.

## 2025-04-02 ENCOUNTER — APPOINTMENT (OUTPATIENT)
Dept: URBAN - METROPOLITAN AREA CLINIC 256 | Age: 85
Setting detail: DERMATOLOGY
End: 2025-04-02

## 2025-04-02 DIAGNOSIS — L57.0 ACTINIC KERATOSIS: ICD-10-CM

## 2025-04-02 DIAGNOSIS — L57.8 OTHER SKIN CHANGES DUE TO CHRONIC EXPOSURE TO NONIONIZING RADIATION: ICD-10-CM

## 2025-04-02 PROCEDURE — OTHER LIQUID NITROGEN: OTHER

## 2025-04-02 PROCEDURE — OTHER COUNSELING: OTHER

## 2025-04-02 PROCEDURE — 17003 DESTRUCT PREMALG LES 2-14: CPT

## 2025-04-02 PROCEDURE — 17000 DESTRUCT PREMALG LESION: CPT

## 2025-04-02 PROCEDURE — OTHER MIPS QUALITY: OTHER

## 2025-04-02 PROCEDURE — 99212 OFFICE O/P EST SF 10 MIN: CPT | Mod: 25

## 2025-04-02 ASSESSMENT — LOCATION SIMPLE DESCRIPTION DERM
LOCATION SIMPLE: RIGHT EAR
LOCATION SIMPLE: RIGHT TEMPLE
LOCATION SIMPLE: LEFT CHEEK
LOCATION SIMPLE: LEFT EAR
LOCATION SIMPLE: NECK

## 2025-04-02 ASSESSMENT — LOCATION DETAILED DESCRIPTION DERM
LOCATION DETAILED: RIGHT SUPERIOR HELIX
LOCATION DETAILED: LEFT CENTRAL LATERAL NECK
LOCATION DETAILED: RIGHT CENTRAL LATERAL NECK
LOCATION DETAILED: LEFT SUPERIOR LATERAL BUCCAL CHEEK
LOCATION DETAILED: LEFT SUPERIOR HELIX
LOCATION DETAILED: RIGHT LATERAL TEMPLE
LOCATION DETAILED: RIGHT CENTRAL POSTERIOR NECK
LOCATION DETAILED: RIGHT INFERIOR HELIX

## 2025-04-02 ASSESSMENT — LOCATION ZONE DERM
LOCATION ZONE: FACE
LOCATION ZONE: NECK
LOCATION ZONE: EAR

## 2025-04-02 NOTE — PROCEDURE: LIQUID NITROGEN
Consent: The patient's verbal consent was obtained including but not limited to risks of crusting, scabbing, blistering, scarring, darker or lighter pigmentary change, recurrence, incomplete removal and infection.
Detail Level: Simple
Render Post-Care Instructions In Note?: no
Show Aperture Variable?: Yes
Number Of Freeze-Thaw Cycles: 1 freeze-thaw cycle
Post-Care Instructions: I reviewed with the patient in detail post-care instructions. Patient is to wear sunprotection, and avoid picking at any of the treated lesions. Pt may apply Vaseline to crusted or scabbing areas.
Duration Of Freeze Thaw-Cycle (Seconds): 3

## 2025-04-28 ENCOUNTER — APPOINTMENT (OUTPATIENT)
Dept: URBAN - METROPOLITAN AREA CLINIC 256 | Age: 85
Setting detail: DERMATOLOGY
End: 2025-04-28

## 2025-04-28 DIAGNOSIS — L57.0 ACTINIC KERATOSIS: ICD-10-CM

## 2025-04-28 DIAGNOSIS — H00.1 CHALAZION: ICD-10-CM

## 2025-04-28 PROBLEM — H00.14 CHALAZION LEFT UPPER EYELID: Status: ACTIVE | Noted: 2025-04-28

## 2025-04-28 PROCEDURE — 99213 OFFICE O/P EST LOW 20 MIN: CPT | Mod: 25

## 2025-04-28 PROCEDURE — OTHER MIPS QUALITY: OTHER

## 2025-04-28 PROCEDURE — OTHER LIQUID NITROGEN: OTHER

## 2025-04-28 PROCEDURE — 17003 DESTRUCT PREMALG LES 2-14: CPT

## 2025-04-28 PROCEDURE — OTHER MONITORING: OTHER

## 2025-04-28 PROCEDURE — OTHER PRESCRIPTION MEDICATION MANAGEMENT: OTHER

## 2025-04-28 PROCEDURE — OTHER PRESCRIPTION: OTHER

## 2025-04-28 PROCEDURE — OTHER PATIENT SPECIFIC COUNSELING: OTHER

## 2025-04-28 PROCEDURE — 17000 DESTRUCT PREMALG LESION: CPT

## 2025-04-28 PROCEDURE — OTHER COUNSELING: OTHER

## 2025-04-28 RX ORDER — DOXYCYCLINE 50 MG/1
TABLET, FILM COATED ORAL
Qty: 60 | Refills: 0 | Status: ERX | COMMUNITY
Start: 2025-04-28

## 2025-04-28 ASSESSMENT — LOCATION DETAILED DESCRIPTION DERM
LOCATION DETAILED: RIGHT PROXIMAL DORSAL RING FINGER
LOCATION DETAILED: RIGHT DORSAL MIDDLE METACARPOPHALANGEAL JOINT
LOCATION DETAILED: LEFT RADIAL DORSAL HAND
LOCATION DETAILED: LEFT LATERAL SUPERIOR TARSAL REGION
LOCATION DETAILED: 2ND WEB SPACE LEFT HAND
LOCATION DETAILED: LEFT INFERIOR CENTRAL MALAR CHEEK
LOCATION DETAILED: RIGHT INFERIOR HELIX

## 2025-04-28 ASSESSMENT — LOCATION SIMPLE DESCRIPTION DERM
LOCATION SIMPLE: LEFT HAND
LOCATION SIMPLE: RIGHT RING FINGER
LOCATION SIMPLE: RIGHT EAR
LOCATION SIMPLE: LEFT CHEEK
LOCATION SIMPLE: LEFT LATERAL SUPERIOR TARSAL REGION
LOCATION SIMPLE: RIGHT HAND

## 2025-04-28 ASSESSMENT — LOCATION ZONE DERM
LOCATION ZONE: EAR
LOCATION ZONE: FINGER
LOCATION ZONE: EYELID
LOCATION ZONE: FACE
LOCATION ZONE: HAND

## 2025-04-28 NOTE — PROCEDURE: PATIENT SPECIFIC COUNSELING
Detail Level: Zone
- Advised the patient to do warm wet compresses for 1 week or until it resolves. \\n- Offered a month’s course of an oral abx as well and patient opted to try.\\n- If still persisting after a month, discussed visiting an eye doctor.

## 2025-04-28 NOTE — PROCEDURE: LIQUID NITROGEN
Post-Care Instructions: I reviewed with the patient in detail post-care instructions. Patient is to wear sunprotection, and avoid picking at any of the treated lesions. Pt may apply Vaseline to crusted or scabbing areas.
Number Of Freeze-Thaw Cycles: 1 freeze-thaw cycle
Detail Level: Simple
Render Note In Bullet Format When Appropriate: No
Consent: The patient's verbal consent was obtained including but not limited to risks of crusting, scabbing, blistering, scarring, darker or lighter pigmentary change, recurrence, incomplete removal and infection.
Duration Of Freeze Thaw-Cycle (Seconds): 3
Show Aperture Variable?: Yes

## 2025-04-28 NOTE — PROCEDURE: PRESCRIPTION MEDICATION MANAGEMENT
Initiate Treatment: Doxycycline monohydrate 50mg tablet BID x 1 month.
Detail Level: Zone
Render In Strict Bullet Format?: No

## 2025-04-28 NOTE — HPI: SKIN LESION
Is This A New Presentation, Or A Follow-Up?: Growth
Additional History: Last Thursday no pain
Which Family Member (Optional)?: Mother

## 2025-07-27 ENCOUNTER — HEALTH MAINTENANCE LETTER (OUTPATIENT)
Age: 85
End: 2025-07-27

## 2025-08-26 ENCOUNTER — APPOINTMENT (OUTPATIENT)
Dept: URBAN - METROPOLITAN AREA CLINIC 256 | Age: 85
Setting detail: DERMATOLOGY
End: 2025-08-26

## 2025-08-26 VITALS — HEIGHT: 67 IN | WEIGHT: 137 LBS

## 2025-08-26 DIAGNOSIS — M67.4 GANGLION: ICD-10-CM

## 2025-08-26 DIAGNOSIS — L57.0 ACTINIC KERATOSIS: ICD-10-CM

## 2025-08-26 PROBLEM — M67.441 GANGLION, RIGHT HAND: Status: ACTIVE | Noted: 2025-08-26

## 2025-08-26 PROCEDURE — 99212 OFFICE O/P EST SF 10 MIN: CPT | Mod: 25

## 2025-08-26 PROCEDURE — OTHER MIPS QUALITY: OTHER

## 2025-08-26 PROCEDURE — 17004 DESTROY PREMAL LESIONS 15/>: CPT

## 2025-08-26 PROCEDURE — OTHER COUNSELING: OTHER

## 2025-08-26 PROCEDURE — OTHER LIQUID NITROGEN: OTHER

## 2025-08-26 ASSESSMENT — LOCATION ZONE DERM
LOCATION ZONE: FINGER
LOCATION ZONE: HAND
LOCATION ZONE: NOSE
LOCATION ZONE: NECK
LOCATION ZONE: FACE

## 2025-08-26 ASSESSMENT — LOCATION DETAILED DESCRIPTION DERM
LOCATION DETAILED: LEFT RADIAL DORSAL HAND
LOCATION DETAILED: 2ND WEB SPACE LEFT HAND
LOCATION DETAILED: RIGHT DORSAL INDEX FINGER METACARPOPHALANGEAL JOINT
LOCATION DETAILED: LEFT INFERIOR LATERAL MALAR CHEEK
LOCATION DETAILED: RIGHT CENTRAL LATERAL NECK
LOCATION DETAILED: LEFT SUPERIOR PREAURICULAR CHEEK
LOCATION DETAILED: RIGHT SUPERIOR LATERAL NECK
LOCATION DETAILED: LEFT SUPERIOR LATERAL NECK
LOCATION DETAILED: RIGHT RADIAL DORSAL HAND
LOCATION DETAILED: DORSAL INTERPHALANGEAL JOINT RIGHT THUMB
LOCATION DETAILED: RIGHT INFERIOR CENTRAL MALAR CHEEK
LOCATION DETAILED: NASAL DORSUM
LOCATION DETAILED: LEFT CENTRAL LATERAL NECK
LOCATION DETAILED: LEFT LATERAL MANDIBULAR CHEEK

## 2025-08-26 ASSESSMENT — LOCATION SIMPLE DESCRIPTION DERM
LOCATION SIMPLE: RIGHT HAND
LOCATION SIMPLE: RIGHT CHEEK
LOCATION SIMPLE: NECK
LOCATION SIMPLE: NOSE
LOCATION SIMPLE: LEFT HAND
LOCATION SIMPLE: RIGHT THUMB
LOCATION SIMPLE: LEFT CHEEK

## (undated) DEVICE — DRAPE MAYO STAND 23X54 8337

## (undated) DEVICE — ENDO TROCAR CONMED AIRSEAL BLADELESS 08X120MM IAS8-120LP

## (undated) DEVICE — ESU GROUND PAD UNIVERSAL W/O CORD

## (undated) DEVICE — SPONGE LAP 18X18" X8435

## (undated) DEVICE — SYR 10ML FINGER CONTROL W/O NDL 309695

## (undated) DEVICE — APPLICATOR ENDOSCOPIC 5 SURGICEL POWDER 3123SPEA

## (undated) DEVICE — DAVINCI XI ESU FORCE BIPOLAR 8MM 471405

## (undated) DEVICE — DAVINCI XI DRAPE ARM 470015

## (undated) DEVICE — SPONGE RAY-TEC 4X4" 7317

## (undated) DEVICE — LINEN TOWEL PACK X5 5464

## (undated) DEVICE — DAVINCI XI GRASPER ENDOWRIST PROGRASP 470093

## (undated) DEVICE — DAVINCI XI TISSUE SEALER SYNCROSEAL 8MM 480440

## (undated) DEVICE — DAVINCI XI ESU FCP BIPOLAR MARYLAND 470172

## (undated) DEVICE — GLOVE BIOGEL PI ULTRATOUCH G SZ 6.5 42165

## (undated) DEVICE — NDL SPINAL 22GA 3.5" QUINCKE 405181

## (undated) DEVICE — BARRIER INTERCEED 3X4" 4350

## (undated) DEVICE — DRAPE IOBAN INCISE 23X17" 6650EZ

## (undated) DEVICE — DRAIN PENROSE 0.25"X18" LATEX FREE GR201

## (undated) DEVICE — GLOVE PROTEXIS BLUE W/NEU-THERA 7.0  2D73EB70

## (undated) DEVICE — ENDO APPLICATOR SURGIFLO PLASMA COBLATION MS1995

## (undated) DEVICE — TUBING CONMED AIRSEAL SMOKE EVAC INSUFFLATION ASM-EVAC

## (undated) DEVICE — RETR ELEV / UTERINE MANIPULATOR V-CARE MED CUP 60-6085-201A

## (undated) DEVICE — SU VICRYL 2-0 SH 27" J317H

## (undated) DEVICE — NDL ANGIOCATH 20GA 1.25" 4056

## (undated) DEVICE — GLOVE PROTEXIS BLUE W/NEU-THERA 6.5  2D73EB65

## (undated) DEVICE — SOL NACL 0.9% IRRIG 1000ML BOTTLE 2F7124

## (undated) DEVICE — TUBING SUCTION 12"X1/4" N612

## (undated) DEVICE — SUCTION TIP POOLE K770

## (undated) DEVICE — SUCTION TIP YANKAUER STR K87

## (undated) DEVICE — WIPES FOLEY CARE SURESTEP PROVON DFC100

## (undated) DEVICE — DAVINCI XI NDL DRIVER LARGE 470006

## (undated) DEVICE — DAVINCI HOT SHEARS TIP COVER  400180

## (undated) DEVICE — SOL WATER IRRIG 1000ML BOTTLE 2F7114

## (undated) DEVICE — DECANTER VIAL 2006S

## (undated) DEVICE — POUCH TISSUE RETRIEVAL ROBOTIC 8MM 5.1" INTRO TRS-ROBO-8

## (undated) DEVICE — SURGICEL POWDER ABSORBABLE HEMOSTAT 3GM 3013SP

## (undated) DEVICE — DRAPE LEGGINGS 8421

## (undated) DEVICE — DRAPE CV SPLIT II 147X106" 9158

## (undated) DEVICE — SOL NACL 0.9% INJ 1000ML BAG 2B1324X

## (undated) DEVICE — PACK DAVINCI GYN SMA15GDFS1

## (undated) DEVICE — ESU PENCIL W/HOLSTER E2350H

## (undated) DEVICE — SU CHROMIC 6-0 G-1 18" 790G

## (undated) DEVICE — KIT PATIENT POSITIONING PIGAZZI LATEX FREE 40580

## (undated) DEVICE — SUCTION CANISTER MEDIVAC LINER 3000ML W/LID 65651-530

## (undated) DEVICE — GLOVE PROTEXIS MICRO 6.5  2D73PM65

## (undated) DEVICE — CATH TRAY FOLEY SURESTEP 16FR WDRAIN BAG STLK LATEX A300316A

## (undated) DEVICE — ESU ELEC NDL 1" E1552

## (undated) DEVICE — DAVINCI XI MONOPOLAR SCISSORS HOT SHEARS 8MM 470179

## (undated) DEVICE — PACK TVT HYSTEROSCOPY SMA15HYFSE

## (undated) DEVICE — NDL COUNTER 10CT

## (undated) DEVICE — NDL 22GA 1.5"

## (undated) DEVICE — PACK OCULOPLATIC SEN15OCFSD

## (undated) DEVICE — ANTIFOG SOLUTION SEE SHARP 150M TROCAR SWABS 30978

## (undated) DEVICE — DAVINCI XI SEAL UNIVERSAL 5-12MM 470500

## (undated) DEVICE — SU SILK 2-0 FSL 18" 677G

## (undated) DEVICE — POUCH TISSUE RETRIEVAL 15MM 6.00" INTRO TRS190SB2

## (undated) DEVICE — GLOVE PROTEXIS W/NEU-THERA 8.5  2D73TE85

## (undated) DEVICE — SYR BULB IRRIG 50ML LATEX FREE 0035280

## (undated) DEVICE — MANIFOLD NEPTUNE 4 PORT 700-20

## (undated) DEVICE — PEN MARKING SKIN FINE 31145942

## (undated) DEVICE — CLEANER INST PRE-KLENZ SOAK SHIELD TUBE 6 ML MEDIUM 2D66J4

## (undated) DEVICE — ESU PENCIL W/SMOKE EVAC E2515HS

## (undated) DEVICE — EYE PREP BETADINE 5% SOLUTION 30ML 0065-0411-30

## (undated) DEVICE — GLOVE BIOGEL PI MICRO SZ 6.5 48565

## (undated) DEVICE — SU MONOCRYL 4-0 PS-2 18" UND Y496G

## (undated) DEVICE — SUCTION IRR STRYKERFLOW II W/TIP 250-070-520

## (undated) DEVICE — BLADE KNIFE SURG 15 371115

## (undated) DEVICE — NDL INSUFFLATION 13GA 120MM C2201

## (undated) DEVICE — KIT ABDOMINAL HYST SMA15AHFSM

## (undated) DEVICE — GLOVE BIOGEL PI MICRO INDICATOR UNDERGLOVE SZ 6.5 48965

## (undated) DEVICE — DAVINCI XI OBTURATOR BLADELESS 8MM 470359

## (undated) DEVICE — DAVINCI XI DRAPE COLUMN 470341

## (undated) DEVICE — SU WND CLOSURE VLOC 180 ABS 0 12" GS-21 VLOCL0316

## (undated) RX ORDER — FENTANYL CITRATE 50 UG/ML
INJECTION, SOLUTION INTRAMUSCULAR; INTRAVENOUS
Status: DISPENSED
Start: 2022-09-01

## (undated) RX ORDER — DEXAMETHASONE SODIUM PHOSPHATE 4 MG/ML
INJECTION, SOLUTION INTRA-ARTICULAR; INTRALESIONAL; INTRAMUSCULAR; INTRAVENOUS; SOFT TISSUE
Status: DISPENSED
Start: 2024-09-23

## (undated) RX ORDER — PROPOFOL 10 MG/ML
INJECTION, EMULSION INTRAVENOUS
Status: DISPENSED
Start: 2024-09-23

## (undated) RX ORDER — LIDOCAINE HYDROCHLORIDE 20 MG/ML
INJECTION, SOLUTION EPIDURAL; INFILTRATION; INTRACAUDAL; PERINEURAL
Status: DISPENSED
Start: 2022-09-01

## (undated) RX ORDER — ONDANSETRON 2 MG/ML
INJECTION INTRAMUSCULAR; INTRAVENOUS
Status: DISPENSED
Start: 2024-09-23

## (undated) RX ORDER — ACETAMINOPHEN 325 MG/1
TABLET ORAL
Status: DISPENSED
Start: 2022-09-01

## (undated) RX ORDER — ACETAMINOPHEN 325 MG/1
TABLET ORAL
Status: DISPENSED
Start: 2024-09-23

## (undated) RX ORDER — HEPARIN SODIUM 5000 [USP'U]/.5ML
INJECTION, SOLUTION INTRAVENOUS; SUBCUTANEOUS
Status: DISPENSED
Start: 2024-09-23

## (undated) RX ORDER — INDOCYANINE GREEN AND WATER 25 MG
KIT INJECTION
Status: DISPENSED
Start: 2024-09-23

## (undated) RX ORDER — PROPOFOL 10 MG/ML
INJECTION, EMULSION INTRAVENOUS
Status: DISPENSED
Start: 2022-09-01

## (undated) RX ORDER — METRONIDAZOLE 500 MG/100ML
INJECTION, SOLUTION INTRAVENOUS
Status: DISPENSED
Start: 2024-09-23

## (undated) RX ORDER — DEXAMETHASONE SODIUM PHOSPHATE 4 MG/ML
INJECTION, SOLUTION INTRA-ARTICULAR; INTRALESIONAL; INTRAMUSCULAR; INTRAVENOUS; SOFT TISSUE
Status: DISPENSED
Start: 2022-09-01

## (undated) RX ORDER — ONDANSETRON 2 MG/ML
INJECTION INTRAMUSCULAR; INTRAVENOUS
Status: DISPENSED
Start: 2022-09-01

## (undated) RX ORDER — HYDROMORPHONE HYDROCHLORIDE 1 MG/ML
INJECTION, SOLUTION INTRAMUSCULAR; INTRAVENOUS; SUBCUTANEOUS
Status: DISPENSED
Start: 2024-09-23

## (undated) RX ORDER — EPHEDRINE SULFATE 50 MG/ML
INJECTION, SOLUTION INTRAMUSCULAR; INTRAVENOUS; SUBCUTANEOUS
Status: DISPENSED
Start: 2024-09-23

## (undated) RX ORDER — KETOROLAC TROMETHAMINE 15 MG/ML
INJECTION, SOLUTION INTRAMUSCULAR; INTRAVENOUS
Status: DISPENSED
Start: 2024-09-23

## (undated) RX ORDER — IBUPROFEN 600 MG/1
TABLET, FILM COATED ORAL
Status: DISPENSED
Start: 2022-09-01

## (undated) RX ORDER — FENTANYL CITRATE 0.05 MG/ML
INJECTION, SOLUTION INTRAMUSCULAR; INTRAVENOUS
Status: DISPENSED
Start: 2024-09-23

## (undated) RX ORDER — CLINDAMYCIN PHOSPHATE 900 MG/50ML
INJECTION, SOLUTION INTRAVENOUS
Status: DISPENSED
Start: 2022-09-01

## (undated) RX ORDER — PROPOFOL 10 MG/ML
INJECTION, EMULSION INTRAVENOUS
Status: DISPENSED
Start: 2020-06-16

## (undated) RX ORDER — LIDOCAINE HYDROCHLORIDE 20 MG/ML
INJECTION, SOLUTION EPIDURAL; INFILTRATION; INTRACAUDAL; PERINEURAL
Status: DISPENSED
Start: 2020-06-16

## (undated) RX ORDER — ONDANSETRON 2 MG/ML
INJECTION INTRAMUSCULAR; INTRAVENOUS
Status: DISPENSED
Start: 2020-06-16

## (undated) RX ORDER — GLYCOPYRROLATE 0.2 MG/ML
INJECTION, SOLUTION INTRAMUSCULAR; INTRAVENOUS
Status: DISPENSED
Start: 2022-09-01

## (undated) RX ORDER — BUPIVACAINE HYDROCHLORIDE 5 MG/ML
INJECTION, SOLUTION EPIDURAL; INTRACAUDAL
Status: DISPENSED
Start: 2024-09-23

## (undated) RX ORDER — CEFAZOLIN SODIUM/WATER 2 G/20 ML
SYRINGE (ML) INTRAVENOUS
Status: DISPENSED
Start: 2024-09-23

## (undated) RX ORDER — FENTANYL CITRATE 50 UG/ML
INJECTION, SOLUTION INTRAMUSCULAR; INTRAVENOUS
Status: DISPENSED
Start: 2024-09-23